# Patient Record
Sex: FEMALE | Race: WHITE | NOT HISPANIC OR LATINO | Employment: OTHER | URBAN - NONMETROPOLITAN AREA
[De-identification: names, ages, dates, MRNs, and addresses within clinical notes are randomized per-mention and may not be internally consistent; named-entity substitution may affect disease eponyms.]

---

## 2022-09-19 LAB
LV EF: 65 %
LVEF MODALITY: NORMAL

## 2023-11-21 PROBLEM — Z79.01 LONG TERM CURRENT USE OF ANTICOAGULANT THERAPY: Status: ACTIVE | Noted: 2023-11-21

## 2023-11-21 PROBLEM — Z79.899 HIGH RISK MEDICATION USE: Status: ACTIVE | Noted: 2023-11-21

## 2023-11-21 PROBLEM — R93.1 ECHOCARDIOGRAM ABNORMAL: Status: ACTIVE | Noted: 2023-11-21

## 2023-11-21 PROBLEM — I48.0 PAROXYSMAL ATRIAL FIBRILLATION (MULTI): Status: ACTIVE | Noted: 2023-11-21

## 2023-11-21 PROBLEM — R03.0 ELEVATED BP WITHOUT DIAGNOSIS OF HYPERTENSION: Status: ACTIVE | Noted: 2023-11-21

## 2023-11-21 PROBLEM — R07.9 CHEST PAIN: Status: ACTIVE | Noted: 2023-11-21

## 2023-11-21 RX ORDER — PANTOPRAZOLE SODIUM 40 MG/1
1 TABLET, DELAYED RELEASE ORAL DAILY
COMMUNITY

## 2023-11-21 RX ORDER — FLECAINIDE ACETATE 100 MG/1
100 TABLET ORAL EVERY 12 HOURS
COMMUNITY
End: 2024-01-19 | Stop reason: SDUPTHER

## 2023-11-21 RX ORDER — WARFARIN SODIUM 5 MG/1
5 TABLET ORAL
COMMUNITY

## 2023-11-21 RX ORDER — PROPRANOLOL/HYDROCHLOROTHIAZID 40 MG-25MG
1 TABLET ORAL DAILY
COMMUNITY
End: 2024-06-05 | Stop reason: ALTCHOICE

## 2023-11-21 RX ORDER — COLD-HOT PACK
1 EACH MISCELLANEOUS DAILY
COMMUNITY

## 2023-11-21 RX ORDER — METOPROLOL SUCCINATE 25 MG/1
1 TABLET, EXTENDED RELEASE ORAL DAILY
COMMUNITY
Start: 2022-07-25

## 2023-11-21 RX ORDER — DICYCLOMINE HYDROCHLORIDE 20 MG/1
1 TABLET ORAL DAILY
COMMUNITY
End: 2024-03-18 | Stop reason: ALTCHOICE

## 2023-11-21 RX ORDER — IBUPROFEN 100 MG/5ML
1 SUSPENSION, ORAL (FINAL DOSE FORM) ORAL DAILY
COMMUNITY

## 2023-11-22 ENCOUNTER — OFFICE VISIT (OUTPATIENT)
Dept: CARDIOLOGY | Facility: CLINIC | Age: 68
End: 2023-11-22
Payer: MEDICARE

## 2023-11-22 VITALS
BODY MASS INDEX: 46.78 KG/M2 | SYSTOLIC BLOOD PRESSURE: 150 MMHG | DIASTOLIC BLOOD PRESSURE: 80 MMHG | WEIGHT: 264 LBS | HEIGHT: 63 IN | HEART RATE: 58 BPM

## 2023-11-22 DIAGNOSIS — Z79.899 HIGH RISK MEDICATION USE: ICD-10-CM

## 2023-11-22 DIAGNOSIS — Z79.01 LONG TERM CURRENT USE OF ANTICOAGULANT THERAPY: ICD-10-CM

## 2023-11-22 DIAGNOSIS — I48.0 PAROXYSMAL ATRIAL FIBRILLATION (MULTI): Primary | ICD-10-CM

## 2023-11-22 PROCEDURE — 93000 ELECTROCARDIOGRAM COMPLETE: CPT | Performed by: INTERNAL MEDICINE

## 2023-11-22 PROCEDURE — 1036F TOBACCO NON-USER: CPT | Performed by: INTERNAL MEDICINE

## 2023-11-22 PROCEDURE — 99214 OFFICE O/P EST MOD 30 MIN: CPT | Performed by: INTERNAL MEDICINE

## 2023-11-22 NOTE — PROGRESS NOTES
"Subjective   Letty Goncalves is a 68 y.o. female       Chief Complaint    Follow-up          HPI     Patient returns in follow-up of problems as noted.  In the interim she has had occasional paroxysms of atrial fibrillation.  She describes palpitation.  She states it can last up to 7 hours.  Unfortunately today she is in sinus rhythm.  Is not clear whether she is going out of rhythm, or not.  We offered Holter monitoring and/or event monitoring but she declines.  Ultimately we advised her to go to the emergency room if she has an episode after hours, but if during office hours call and we will attempt to perform an EKG in the office to determine if in fact her complaints are rhythm related    She has a long list of other subjective complaints.  Activity intolerance, easy fatigue, subjective shortness of breath.  Also atypical chest pain.  These been going on for several years.  Because of those complaints a stress perfusion study was done last year and it is normal.    She is a never smoker, nondiabetic, and there is no family history of CAD.  Her only risk factor, blood pressure, is well controlled.  Lipid status is also favorable.  The likelihood of coronary disease in light of her risk profile and a recently negative stress test is very low because of this education and reassurance were provided.          Visit Vitals  /80 (BP Location: Left arm, Patient Position: Sitting)   Pulse 58   Ht 1.6 m (5' 3\")   Wt 120 kg (264 lb)   BMI 46.77 kg/m²   Smoking Status Never   BSA 2.31 m²      EKG done in office today     Objective   Physical Exam  Constitutional:       Appearance: Normal appearance. She is normal weight.   HENT:      Nose: Nose normal.   Neck:      Vascular: No carotid bruit.   Cardiovascular:      Rate and Rhythm: Normal rate.      Pulses: Normal pulses.      Heart sounds: Normal heart sounds.   Pulmonary:      Effort: Pulmonary effort is normal.   Abdominal:      General: Bowel sounds are normal.    "   Palpations: Abdomen is soft.   Genitourinary:     Rectum: Normal.   Musculoskeletal:         General: Normal range of motion.      Cervical back: Normal range of motion.      Right lower leg: No edema.      Left lower leg: No edema.   Skin:     General: Skin is warm and dry.   Neurological:      General: No focal deficit present.      Mental Status: She is alert.   Psychiatric:         Mood and Affect: Mood normal.         Behavior: Behavior normal.         Thought Content: Thought content normal.         Judgment: Judgment normal.         Current Medications    Current Outpatient Medications:     ascorbic acid (Vitamin C) 1,000 mg tablet, Take 1 tablet (1,000 mg) by mouth once daily., Disp: , Rfl:     BACILLUS COAGULANS-INULIN ORAL, Take 1 capsule by mouth once daily., Disp: , Rfl:     cranberry conc-C-bacillus coag (Cranberry Urinary Tract Health) 250-30-3.5 mg tablet, Take 1 tablet by mouth once daily., Disp: , Rfl:     dicyclomine (Bentyl) 20 mg tablet, Take 1 capsule by mouth once daily., Disp: , Rfl:     flecainide (Tambocor) 100 mg tablet, Take 1 tablet (100 mg) by mouth every 12 hours., Disp: , Rfl:     metoprolol succinate XL (Toprol-XL) 25 mg 24 hr tablet, Take 1 tablet (25 mg) by mouth once daily., Disp: , Rfl:     multivitamin-Ca-iron-minerals (Tab-A-Cj Womens) 27-0.4 mg tablet, Take 1 tablet by mouth once daily., Disp: , Rfl:     pantoprazole (Protonix) 40 mg EC tablet, Take 1 tablet (40 mg) by mouth once daily., Disp: , Rfl:     turmeric-turmeric root extract 450-50 mg capsule, Take 1 capsule by mouth once daily., Disp: , Rfl:     warfarin (Coumadin) 5 mg tablet, Take 1 tablet (5 mg) by mouth. Take as directed per [facility] coumadin clinic, Disp: , Rfl:                      Assessment/Plan     1. Paroxysmal atrial fibrillation (CMS/HCC)  Symptomatic episodes are noted.  She was encouraged to either go to emergency or come to the office for an EKG.  She declined monitoring.    2. High risk medication  use  QTc is acceptable on current EKG    3. Long term current use of anticoagulant therapy  Adequate control and no bleeding complications.

## 2024-01-18 ENCOUNTER — TELEPHONE (OUTPATIENT)
Dept: CARDIOLOGY | Facility: CLINIC | Age: 69
End: 2024-01-18
Payer: MEDICARE

## 2024-01-18 DIAGNOSIS — I48.0 PAROXYSMAL ATRIAL FIBRILLATION (MULTI): Primary | ICD-10-CM

## 2024-01-18 NOTE — TELEPHONE ENCOUNTER
Patient phoned in stating she told you she would be willing to do a holter after the holidays. She is calling to ask if this still needs to be done?        Please advise.    To Dr. Bon James MD

## 2024-01-19 DIAGNOSIS — I48.0 PAROXYSMAL ATRIAL FIBRILLATION (MULTI): Primary | ICD-10-CM

## 2024-01-19 RX ORDER — FLECAINIDE ACETATE 100 MG/1
100 TABLET ORAL EVERY 12 HOURS
Qty: 180 TABLET | Refills: 3 | Status: SHIPPED | OUTPATIENT
Start: 2024-01-19 | End: 2024-03-12 | Stop reason: DRUGHIGH

## 2024-01-19 NOTE — TELEPHONE ENCOUNTER
Spoke with patient states she would like event monitor to be done at [facility].     Task sent to  to call and arrange once order signed.

## 2024-02-01 ENCOUNTER — ANCILLARY PROCEDURE (OUTPATIENT)
Dept: CARDIOLOGY | Facility: CLINIC | Age: 69
End: 2024-02-01
Payer: MEDICARE

## 2024-02-01 ENCOUNTER — TELEPHONE (OUTPATIENT)
Dept: CARDIOLOGY | Facility: CLINIC | Age: 69
End: 2024-02-01

## 2024-02-01 DIAGNOSIS — I48.0 PAROXYSMAL ATRIAL FIBRILLATION (MULTI): ICD-10-CM

## 2024-02-01 PROCEDURE — 93272 ECG/REVIEW INTERPRET ONLY: CPT | Performed by: INTERNAL MEDICINE

## 2024-03-07 DIAGNOSIS — I48.0 PAROXYSMAL ATRIAL FIBRILLATION (MULTI): ICD-10-CM

## 2024-03-07 NOTE — TELEPHONE ENCOUNTER
----- Message from Bon James MD sent at 3/6/2024  5:58 PM EST -----  Event monitor shows frequent episodes of atrial fibrillation.  I recommend increasing flecainide to 150 mg twice a day and EKG in about 5 days.  ----- Message -----  From: Bon James MD  Sent: 3/5/2024   4:57 PM EST  To: Bon James MD

## 2024-03-14 RX ORDER — FLECAINIDE ACETATE 150 MG/1
150 TABLET ORAL 2 TIMES DAILY
Qty: 60 TABLET | Refills: 11 | Status: SHIPPED | OUTPATIENT
Start: 2024-03-14 | End: 2025-03-14

## 2024-03-18 ENCOUNTER — CLINICAL SUPPORT (OUTPATIENT)
Dept: CARDIOLOGY | Facility: CLINIC | Age: 69
End: 2024-03-18
Payer: MEDICARE

## 2024-03-18 VITALS
HEART RATE: 61 BPM | BODY MASS INDEX: 47.66 KG/M2 | HEIGHT: 62 IN | SYSTOLIC BLOOD PRESSURE: 140 MMHG | DIASTOLIC BLOOD PRESSURE: 90 MMHG | WEIGHT: 259 LBS

## 2024-03-18 DIAGNOSIS — I48.0 PAROXYSMAL ATRIAL FIBRILLATION (MULTI): ICD-10-CM

## 2024-03-18 PROCEDURE — 93000 ELECTROCARDIOGRAM COMPLETE: CPT | Performed by: INTERNAL MEDICINE

## 2024-03-18 NOTE — PROGRESS NOTES
"Patient here for at EKG visit ordered by Dr. James due to Afib. Dr. James in suite physician. Patient here due to recent medication changes . Flecainide increased to 150mg BID. Medication list Updated verbally.NO cardiac complaints. Discussed with Kaycee Armas RN prior to discharge.     To Dr. James for review        Vitals:    03/18/24 1647   BP: 140/90   BP Location: Right arm   Patient Position: Sitting   Pulse: 61   Weight: 117 kg (259 lb)   Height: 1.575 m (5' 2\")       "

## 2024-05-01 ENCOUNTER — TELEPHONE (OUTPATIENT)
Dept: CARDIOLOGY | Facility: CLINIC | Age: 69
End: 2024-05-01
Payer: MEDICARE

## 2024-05-01 NOTE — TELEPHONE ENCOUNTER
Patient phoned is scheduled to have esophageal scope with Dr. Stauffer on 5/21/24, requesting to hold Warfarin for 5 days prior to procedure. Please advise.    To Dr. Bon James MD for review.

## 2024-05-02 NOTE — TELEPHONE ENCOUNTER
Phoned and spoke with patient. Advised ok to hold and to advise clinic that monitors of the hold. Patient verbalized understanding.

## 2024-06-05 ENCOUNTER — OFFICE VISIT (OUTPATIENT)
Dept: CARDIOLOGY | Facility: CLINIC | Age: 69
End: 2024-06-05
Payer: MEDICARE

## 2024-06-05 VITALS
DIASTOLIC BLOOD PRESSURE: 88 MMHG | HEIGHT: 62 IN | WEIGHT: 253 LBS | SYSTOLIC BLOOD PRESSURE: 138 MMHG | BODY MASS INDEX: 46.56 KG/M2 | HEART RATE: 66 BPM

## 2024-06-05 DIAGNOSIS — Z79.01 LONG TERM CURRENT USE OF ANTICOAGULANT THERAPY: Primary | ICD-10-CM

## 2024-06-05 DIAGNOSIS — I48.0 PAROXYSMAL ATRIAL FIBRILLATION (MULTI): ICD-10-CM

## 2024-06-05 PROCEDURE — 93000 ELECTROCARDIOGRAM COMPLETE: CPT | Performed by: INTERNAL MEDICINE

## 2024-06-05 PROCEDURE — 1159F MED LIST DOCD IN RCRD: CPT | Performed by: INTERNAL MEDICINE

## 2024-06-05 PROCEDURE — 1036F TOBACCO NON-USER: CPT | Performed by: INTERNAL MEDICINE

## 2024-06-05 PROCEDURE — 3008F BODY MASS INDEX DOCD: CPT | Performed by: INTERNAL MEDICINE

## 2024-06-05 PROCEDURE — 99214 OFFICE O/P EST MOD 30 MIN: CPT | Performed by: INTERNAL MEDICINE

## 2024-06-05 RX ORDER — SPIRONOLACTONE 25 MG/1
12.5 TABLET ORAL DAILY
Qty: 45 TABLET | Refills: 3 | Status: SHIPPED | OUTPATIENT
Start: 2024-06-05 | End: 2025-06-05

## 2024-06-05 RX ORDER — HYOSCYAMINE SULFATE 0.125 MG
TABLET ORAL
COMMUNITY
Start: 2024-06-01

## 2024-06-05 RX ORDER — ATORVASTATIN CALCIUM 20 MG/1
20 TABLET, FILM COATED ORAL DAILY
COMMUNITY

## 2024-06-05 RX ORDER — FENUGREEK SEED/BL.THISTLE/ANIS 340 MG
1 CAPSULE ORAL DAILY
COMMUNITY

## 2024-06-05 NOTE — PROGRESS NOTES
"Subjective   Letty Goncalves is a 68 y.o. female       Chief Complaint    Follow-up          HPI     Patient returns in follow-up of problems as noted.  She is done relatively well but occasionally has breakthroughs of atrial fibrillation.  She is already on a rather aggressive dose of flecainide and because of this am hesitant to increase the dose.  I recommend, though, the addition of spironolactone.  I believe this will probably reduce the frequency and duration of her episodes of atrial fibrillation.  The physiology behind this was explained.  She concurs and will subscribe to my recommendation.    In regards to her anticoagulant strategy she is doing well and is well-tolerated without complaint or side effect.  We note lipids are adequately controlled as well and because of this we suggest no change.  We did advocate the merits of diet and weight loss.      Vitals:    06/05/24 0922   BP: 138/88   BP Location: Left arm   Patient Position: Sitting   Pulse: 66   Weight: 115 kg (253 lb)   Height: 1.575 m (5' 2\")      EKG done in office today      Objective   Physical Exam  Constitutional:       Appearance: Normal appearance.   HENT:      Nose: Nose normal.   Neck:      Vascular: No carotid bruit.   Cardiovascular:      Rate and Rhythm: Normal rate.      Pulses: Normal pulses.      Heart sounds: Normal heart sounds.   Pulmonary:      Effort: Pulmonary effort is normal.   Abdominal:      General: Bowel sounds are normal.      Palpations: Abdomen is soft.   Musculoskeletal:         General: Normal range of motion.      Cervical back: Normal range of motion.      Right lower leg: No edema.      Left lower leg: No edema.   Skin:     General: Skin is warm and dry.   Neurological:      General: No focal deficit present.      Mental Status: She is alert.   Psychiatric:         Mood and Affect: Mood normal.         Behavior: Behavior normal.         Thought Content: Thought content normal.         Judgment: Judgment normal. "         Allergies  Patient has no known allergies.     Current Medications    Current Outpatient Medications:     ascorbic acid (Vitamin C) 1,000 mg tablet, Take 1 tablet (1,000 mg) by mouth once daily., Disp: , Rfl:     atorvastatin (Lipitor) 20 mg tablet, Take 1 tablet (20 mg) by mouth once daily., Disp: , Rfl:     calcium polycarbophil (FIBERCON ORAL), Take 1 capsule by mouth once daily., Disp: , Rfl:     cranberry conc-C-bacillus coag (Cranberry Urinary Tract Health) 250-30-3.5 mg tablet, Take 1 tablet by mouth once daily., Disp: , Rfl:     flecainide (Tambocor) 150 mg tablet, Take 1 tablet (150 mg) by mouth 2 times a day., Disp: 60 tablet, Rfl: 11    hyoscyamine (Anaspaz, Levsin) 0.125 mg tablet, TAKE 1 TABLET BY MOUTH 4 TIMES DAILY AS NEEDED FOR DYSPEPSIA FOR 30 DAYS, Disp: , Rfl:     Lactobac 42-Bifid 6-mmyizq-CGW (Probiotic Plus Colostrum) -50 mg powder in packet, Take 1 capsule by mouth once daily., Disp: , Rfl:     metoprolol succinate XL (Toprol-XL) 25 mg 24 hr tablet, Take 1 tablet (25 mg) by mouth once daily., Disp: , Rfl:     multivitamin-Ca-iron-minerals (Tab-A-Cj Womens) 27-0.4 mg tablet, Take 1 tablet by mouth once daily., Disp: , Rfl:     pantoprazole (Protonix) 40 mg EC tablet, Take 1 tablet (40 mg) by mouth once daily., Disp: , Rfl:     warfarin (Coumadin) 5 mg tablet, Take 1 tablet (5 mg) by mouth. Take as directed per [facility] [place] [place] coumadin clinic, Disp: , Rfl:     spironolactone (Aldactone) 25 mg tablet, Take 0.5 tablets (12.5 mg) by mouth once daily., Disp: 45 tablet, Rfl: 3                     Assessment/Plan   1. Paroxysmal atrial fibrillation (Multi)  Brief paroxysms noted.  I believe that spironolactone probably will help reduce frequency  - Follow Up In Cardiology      2. Long term current use of anticoagulant therapy  Well-tolerated and stable.  No complaints or side effects    3. BMI 45.0-49.9, adult (Multi)  The merits of weight loss advocated        Scribe Attestation  By signing my  name below, I, Usama Mcdaniel LPNibmatt   attest that this documentation has been prepared under the direction and in the presence of Bon James MD.     Provider Attestation - Scribe documentation    All medical record entries made by the Scribe were at my direction and personally dictated by me. I have reviewed the chart and agree that the record accurately reflects my personal performance of the history, physical exam, discussion and plan.

## 2024-06-21 ENCOUNTER — TELEPHONE (OUTPATIENT)
Dept: CARDIOLOGY | Facility: CLINIC | Age: 69
End: 2024-06-21
Payer: MEDICARE

## 2024-06-21 DIAGNOSIS — I48.0 PAROXYSMAL ATRIAL FIBRILLATION (MULTI): ICD-10-CM

## 2024-06-21 NOTE — TELEPHONE ENCOUNTER
----- Message from Letty Goncalves sent at 6/21/2024  3:28 PM EDT -----  Regarding: Heart rate  Contact: [phone]  It been three days. Dizzy, lighthead

## 2024-06-21 NOTE — TELEPHONE ENCOUNTER
Phone call to patient, states she continues with heart rate at 100 with no improvement. Still complains of dizziness and lightheadedness, denies any worsening of symptoms or any new symptoms at this time. Advised patient to go to nearest emergency room if symptoms should worsen.

## 2024-06-21 NOTE — TELEPHONE ENCOUNTER
----- Message from Letty Goncalves sent at 6/21/2024  3:28 PM EDT -----  Regarding: Heart rate  Contact: [phone]  It been three days. Dizzy, lighthead

## 2024-06-25 RX ORDER — METOPROLOL SUCCINATE 25 MG/1
25 TABLET, EXTENDED RELEASE ORAL DAILY
Qty: 90 TABLET | Refills: 3 | Status: SHIPPED | OUTPATIENT
Start: 2024-06-25 | End: 2024-06-26 | Stop reason: SDUPTHER

## 2024-06-26 DIAGNOSIS — I48.0 PAROXYSMAL ATRIAL FIBRILLATION (MULTI): ICD-10-CM

## 2024-06-26 DIAGNOSIS — R00.0 TACHYCARDIA: ICD-10-CM

## 2024-06-26 NOTE — TELEPHONE ENCOUNTER
Left message for patient to call back regarding recommendations.   Regarding: FW: Heart rate  Contact: [phone]  Increase metoprolol to 50 mg a day.  Come to the office for blood pressure check with orthostatics and EKG  ----- Message -----  From: Ollie Hollingsworth MA  Sent: 6/21/2024   3:47 PM EDT  To: Bon James MD  Subject: Heart rate                                       ----- Message from Ollie Hollingsworth MA sent at 6/21/2024  3:47 PM EDT -----       ----- Message from Letty Goncalves to Bon James MD sent at 6/21/2024  3:28 PM -----   It been three days. Dizzy, lighthead      ----- Message -----       From:Ollie AGUERO       Sent:6/21/2024  2:38 PM EDT         To:Letty Goncalves    Subject:Heart rate    Alexandramatt Mrs. Goncalves,  Are you having any symptoms along with this heart rate? How long has this been going on for?  ARNIE Reed       ----- Message -----       From:Letty Goncalves       Sent:6/21/2024 12:12 PM EDT         To:Bon James    Subject:Heart rate    My heart rate has been 100 resting. Is this OK?

## 2024-06-26 NOTE — TELEPHONE ENCOUNTER
Patient phoned, orders given. Verbalized understanding. Give to scheduling to follow-up for bp OV with EKG. Patient would prefer [place] office next week if possible.

## 2024-06-27 RX ORDER — METOPROLOL SUCCINATE 50 MG/1
50 TABLET, EXTENDED RELEASE ORAL DAILY
Qty: 90 TABLET | Refills: 3 | Status: SHIPPED | OUTPATIENT
Start: 2024-06-27 | End: 2025-06-27

## 2024-06-28 LAB
NON-UH HIE ANION GAP:SCNC:PT:SER/PLAS:QN:: 8 MEQ/L (ref 6–16)
NON-UH HIE CALCIUM:MCNC:PT:SER/PLAS:QN:: 9.4 MG/DL (ref 8.9–11.1)
NON-UH HIE CARBON DIOXIDE:SCNC:PT:SER/PLAS:QN:: 30 MMOL/L (ref 21–31)
NON-UH HIE CHLORIDE:SCNC:PT:SER/PLAS:QN:: 106 MMOL/L (ref 101–111)
NON-UH HIE CREATININE:MCNC:PT:SER/PLAS:QN:: 0.7 MG/DL (ref 0.5–1.3)
NON-UH HIE EGFR: 94 ML/MIN/1.73 M2
NON-UH HIE GLUCOSE:MCNC:PT:SER/PLAS:QN:: 95 MG/DL (ref 55–199)
NON-UH HIE POTASSIUM:SCNC:PT:SER/PLAS:QN:: 4.1 MMOL/L (ref 3.5–5.3)
NON-UH HIE SODIUM:SCNC:PT:SER/PLAS:QN:: 140 MMOL/L (ref 135–145)
NON-UH HIE UREA NITROGEN/CREATININE:MRTO:PT:SER/PLAS:QN:: 21 NO UNITS (ref 10–20)
NON-UH HIE UREA NITROGEN:MCNC:PT:SER/PLAS:QN:: 15 MG/DL (ref 5–21)

## 2024-07-01 ENCOUNTER — APPOINTMENT (OUTPATIENT)
Dept: CARDIOLOGY | Facility: CLINIC | Age: 69
End: 2024-07-01
Payer: MEDICARE

## 2024-07-02 ENCOUNTER — APPOINTMENT (OUTPATIENT)
Dept: CARDIOLOGY | Facility: CLINIC | Age: 69
End: 2024-07-02
Payer: MEDICARE

## 2024-07-02 VITALS
BODY MASS INDEX: 46.82 KG/M2 | HEART RATE: 62 BPM | WEIGHT: 256 LBS | DIASTOLIC BLOOD PRESSURE: 96 MMHG | SYSTOLIC BLOOD PRESSURE: 132 MMHG

## 2024-07-02 DIAGNOSIS — Z79.01 LONG TERM CURRENT USE OF ANTICOAGULANT THERAPY: ICD-10-CM

## 2024-07-02 DIAGNOSIS — R00.0 TACHYCARDIA: ICD-10-CM

## 2024-07-02 DIAGNOSIS — I48.0 PAROXYSMAL ATRIAL FIBRILLATION (MULTI): ICD-10-CM

## 2024-07-02 DIAGNOSIS — I10 ESSENTIAL HYPERTENSION: ICD-10-CM

## 2024-07-02 PROBLEM — I89.0 LYMPHEDEMA: Status: ACTIVE | Noted: 2024-07-02

## 2024-07-02 PROBLEM — R03.0 ELEVATED BP WITHOUT DIAGNOSIS OF HYPERTENSION: Status: RESOLVED | Noted: 2023-11-21 | Resolved: 2024-07-02

## 2024-07-02 PROBLEM — Z79.899 HIGH RISK MEDICATION USE: Status: RESOLVED | Noted: 2023-11-21 | Resolved: 2024-07-02

## 2024-07-02 PROCEDURE — 1036F TOBACCO NON-USER: CPT | Performed by: INTERNAL MEDICINE

## 2024-07-02 PROCEDURE — 3080F DIAST BP >= 90 MM HG: CPT | Performed by: INTERNAL MEDICINE

## 2024-07-02 PROCEDURE — 1159F MED LIST DOCD IN RCRD: CPT | Performed by: INTERNAL MEDICINE

## 2024-07-02 PROCEDURE — 3008F BODY MASS INDEX DOCD: CPT | Performed by: INTERNAL MEDICINE

## 2024-07-02 PROCEDURE — 93000 ELECTROCARDIOGRAM COMPLETE: CPT | Performed by: INTERNAL MEDICINE

## 2024-07-02 PROCEDURE — 3075F SYST BP GE 130 - 139MM HG: CPT | Performed by: INTERNAL MEDICINE

## 2024-07-02 PROCEDURE — 99212 OFFICE O/P EST SF 10 MIN: CPT | Performed by: INTERNAL MEDICINE

## 2024-07-02 RX ORDER — VALSARTAN 80 MG/1
80 TABLET ORAL DAILY
Qty: 90 TABLET | Refills: 3 | Status: SHIPPED | OUTPATIENT
Start: 2024-07-02 | End: 2025-07-02

## 2024-07-02 ASSESSMENT — ENCOUNTER SYMPTOMS
PALPITATIONS: 1
HYPERTENSION: 1
DIZZINESS: 1
LIGHT-HEADEDNESS: 1

## 2024-07-02 NOTE — PROGRESS NOTES
Subjective   Letty Winters is a 68 y.o. female       Chief Complaint    Hypertension          Hypertension  Associated symptoms include palpitations.      The patient was seen today for follow-up for hypertension EKG ordered by Dr. James, she will continue to follow-up with me down the road.  I know her from taking care of her  Tawanda winters.  The patient continued to have palpitations and apparently event monitor have demonstrated breakthrough atrial fibrillation while she is on maximal dose of flecainide.  Addressed that with the patient and recommended consideration of ablation which she is agreeable to.  As far as hypertension management I will add valsartan 80 mg daily to present dose spironolactone if necessary titrate upwards.  More weight loss is needed  Review of Systems   Cardiovascular:  Positive for palpitations.   Neurological:  Positive for dizziness and light-headedness.            Vitals:    07/02/24 0951 07/02/24 0952 07/02/24 0953   BP: (!) 148/92 (!) 136/92 (!) 132/96   BP Location: Left arm Left arm Right arm   Patient Position: Sitting Standing Sitting   Pulse: 62     Weight: 116 kg (256 lb)          EKG done in office today     Objective   Physical Exam  Constitutional:       Appearance: Normal appearance.   HENT:      Nose: Nose normal.   Neck:      Vascular: No carotid bruit.   Cardiovascular:      Rate and Rhythm: Normal rate.      Pulses: Normal pulses.      Heart sounds: Normal heart sounds.   Pulmonary:      Effort: Pulmonary effort is normal.   Abdominal:      General: Bowel sounds are normal.      Palpations: Abdomen is soft.   Musculoskeletal:         General: Normal range of motion.      Cervical back: Normal range of motion.      Right lower leg: No edema.      Left lower leg: No edema.   Skin:     General: Skin is warm and dry.   Neurological:      General: No focal deficit present.      Mental Status: She is alert.   Psychiatric:         Mood and Affect: Mood normal.          Behavior: Behavior normal.         Thought Content: Thought content normal.         Judgment: Judgment normal.         Allergies  Patient has no known allergies.     Current Medications    Current Outpatient Medications:     ascorbic acid (Vitamin C) 1,000 mg tablet, Take 1 tablet (1,000 mg) by mouth once daily., Disp: , Rfl:     atorvastatin (Lipitor) 20 mg tablet, Take 1 tablet (20 mg) by mouth once daily., Disp: , Rfl:     calcium polycarbophil (FIBERCON ORAL), Take 1 capsule by mouth once daily., Disp: , Rfl:     cranberry conc-C-bacillus coag (Cranberry Urinary Tract Health) 250-30-3.5 mg tablet, Take 1 tablet by mouth once daily., Disp: , Rfl:     flecainide (Tambocor) 150 mg tablet, Take 1 tablet (150 mg) by mouth 2 times a day., Disp: 60 tablet, Rfl: 11    hyoscyamine (Anaspaz, Levsin) 0.125 mg tablet, TAKE 1 TABLET BY MOUTH 4 TIMES DAILY AS NEEDED FOR DYSPEPSIA FOR 30 DAYS, Disp: , Rfl:     Lactobac 42-Bifid 4-enrdnb-CNC (Probiotic Plus Colostrum) -50 mg powder in packet, Take 1 capsule by mouth once daily., Disp: , Rfl:     metoprolol succinate XL (Toprol-XL) 50 mg 24 hr tablet, Take 1 tablet (50 mg) by mouth once daily., Disp: 90 tablet, Rfl: 3    multivitamin-Ca-iron-minerals (Tab-A-Cj Womens) 27-0.4 mg tablet, Take 1 tablet by mouth once daily., Disp: , Rfl:     pantoprazole (Protonix) 40 mg EC tablet, Take 1 tablet (40 mg) by mouth once daily., Disp: , Rfl:     spironolactone (Aldactone) 25 mg tablet, Take 0.5 tablets (12.5 mg) by mouth once daily., Disp: 45 tablet, Rfl: 3    warfarin (Coumadin) 5 mg tablet, Take 1 tablet (5 mg) by mouth. Take as directed per [facility] [place], Disp: , Rfl:                      Assessment/Plan   1. Tachycardia  Follow Up In Cardiology      2. Paroxysmal atrial fibrillation (Multi)        3. Essential hypertension        4. Long term current use of anticoagulant therapy        5. BMI 45.0-49.9, adult (Multi)                 Scribe Attestation  By signing my  name below, I, Usama Su LPNibmatt   attest that this documentation has been prepared under the direction and in the presence of Analy Zhang MD.     Provider Attestation - Scribe documentation    All medical record entries made by the Scribe were at my direction and personally dictated by me. I have reviewed the chart and agree that the record accurately reflects my personal performance of the history, physical exam, discussion and plan.

## 2024-07-07 DIAGNOSIS — I48.0 PAROXYSMAL ATRIAL FIBRILLATION (MULTI): Primary | ICD-10-CM

## 2024-07-09 RX ORDER — METOPROLOL SUCCINATE 25 MG/1
25 TABLET, EXTENDED RELEASE ORAL DAILY
Qty: 90 TABLET | Refills: 0 | Status: SHIPPED | OUTPATIENT
Start: 2024-07-09

## 2024-07-16 DIAGNOSIS — I48.0 PAROXYSMAL ATRIAL FIBRILLATION (MULTI): ICD-10-CM

## 2024-07-16 RX ORDER — METOPROLOL SUCCINATE 50 MG/1
50 TABLET, EXTENDED RELEASE ORAL DAILY
Qty: 90 TABLET | Refills: 3 | Status: SHIPPED | OUTPATIENT
Start: 2024-07-16 | End: 2025-07-16

## 2024-07-17 NOTE — PROGRESS NOTES
Current Outpatient Medications   Medication Instructions    ascorbic acid (Vitamin C) 1,000 mg tablet 1 tablet, oral, Daily    atorvastatin (LIPITOR) 20 mg, oral, Daily    calcium polycarbophil (FIBERCON ORAL) 1 capsule, oral, Daily    cranberry conc-C-bacillus coag (Cranberry Urinary Tract Health) 250-30-3.5 mg tablet 1 tablet, oral, Daily    flecainide (TAMBOCOR) 150 mg, oral, 2 times daily    hyoscyamine (Anaspaz, Levsin) 0.125 mg tablet TAKE 1 TABLET BY MOUTH 4 TIMES DAILY AS NEEDED FOR DYSPEPSIA FOR 30 DAYS    Lactobac 42-Bifid 5-uwfgye-BWT (Probiotic Plus Colostrum) -50 mg powder in packet 1 capsule, oral, Daily    metoprolol succinate XL (TOPROL-XL) 50 mg, oral, Daily    multivitamin-Ca-iron-minerals (Tab-A-Cj Womens) 27-0.4 mg tablet 1 tablet, oral, Daily    pantoprazole (Protonix) 40 mg EC tablet 1 tablet, oral, Daily    spironolactone (ALDACTONE) 12.5 mg, oral, Daily    valsartan (DIOVAN) 80 mg, oral, Daily    warfarin (COUMADIN) 5 mg, oral, Take as directed per [facility] coumadin clinic      Subjective   Letty Goncalves is a 68 y.o. female.    Chief Complaint:  Atrial Fibrillation  [id number] Score: 3     HPI    Patient is referred by Dr. Analy Zhang for A-fib, possible RFA.    PMH includes:HTN, pAF, lymphedema,     AF index DX: 06/2022 during a colonoscopy  Symptoms include: palpitations, dizziness and chest pain.  Treatment includes: flecainide, Toprol XL, warfarin    The patient was initially diagnosed with A-fib in 06/2022 during her colonoscopy and started on Toprol XL. In 08/2022, Dr. James started her on anticoagulation with Xarelto. She underwent a stress test and echo in 09/2022 to determine an appropriate antiarrhythmic medication. The stress test showed an EF of 65% with no evidence of ischemia, and the echo revealed a moderately dilated LA and moderate mitral annular calcification. Due to being highly symptomatic with her A-fib, experiencing palpitations, chest pain, and dizziness,  she was started on Flecainide 50mg BID on 09/29/22.    Due to the cost of Xarelto, her anticoagulation was changed to Coumadin. In 02/2023, the patient reported increased episodes of A-fib to her cardiology office, and her Flecainide dosage was increased to 100mg BID. Later in 2023, her Flecainide dosage was further increased to 150mg BID due to continued A-fib episodes. She has never required cardioversion.    Now, despite being on maximal doses of Flecainide, she continues to have breakthrough episodes of A-fib and presents today to discuss the possibility of RFA    TESTING    EVENT MONITOR 30 day 02/01/2024:    1.  There were 30 patient triggered events and 13 automatically triggered events.     2.  Of the patient triggered events 27 demonstrated sinus bradycardia or sinus rhythm.  There were occasional PACs.  The remaining 3 patient triggered events demonstrated atrial fibrillation and/or atrial flutter.     3.  There were 13 automatically triggered events.  Most events were associated with atrial fibrillation.  There were, though, 2 episodes of wide-complex rhythm which appeared to be ventricular tachycardia.  1 episode was a 5 beat run of ventricular tachycardia rate of 126 bpm.  The other episode was a sustained run of ventricular tachycardia with an average heart rate of 176 bpm.  These episodes may represent true ventricular tachyarrhythmia, or, could represent atrial fibrillation with abarent conduction.     4.  The patient had no symptoms reported in the diary.    -ECHO 09/20/2022  CONCLUSIONS:   1. Left ventricular systolic function is normal with a 65-70% estimated ejection fraction.   2. Spectral Doppler shows an impaired relaxation pattern of left ventricular diastolic filling.   3. The left atrium is moderately dilated.   4. There is moderate mitral annular calcification.   5. Aortic valve sclerosis    NUCLEAR STRESS TEST 09/19/22  IMPRESSION:  Normal [employer] cardiac perfusion stress  test.  No evidence of ischemia or myocardial infarction by perfusion imaging.  Normal left ventricular systolic function, ejection fraction 65%.  No previous studies are available for comparison.    Review of Systems   Constitutional: Positive for malaise/fatigue.   HENT: Negative.     Eyes: Negative.    Cardiovascular:  Positive for dyspnea on exertion, leg swelling and palpitations.   Respiratory:  Positive for shortness of breath.    Endocrine: Negative.    Skin: Negative.    Musculoskeletal:  Positive for arthritis and muscle weakness.   Gastrointestinal: Negative.    Genitourinary: Negative.    Neurological:  Positive for weakness.   Psychiatric/Behavioral: Negative.         Objective   Constitutional:       Appearance: Healthy appearance. Not in distress.   Eyes:      Pupils: Pupils are equal, round, and reactive to light.   Neck:      Thyroid: Thyroid normal.      Vascular: JVD normal.   Pulmonary:      Effort: Pulmonary effort is normal.      Breath sounds: Normal breath sounds.   Cardiovascular:      Normal rate. Regular rhythm. S1 with normal intensity. S2 with normal intensity.       Murmurs: There is no murmur.      No gallop.  No click. No rub.      Comments: Lymphedema    Edema:     Thigh: bilateral 4+ edema of the thigh.     Pretibial: bilateral 4+ edema of the pretibial area.     Ankle: bilateral 4+ edema of the ankle.     Feet: bilateral 4+ edema of the feet.  Musculoskeletal: Normal range of motion.      Cervical back: Normal range of motion and neck supple. Skin:     General: Skin is warm and moist.   Neurological:      General: No focal deficit present.      Mental Status: Alert and oriented to person, place and time.      Motor: Motor function is intact.      Gait: Gait is intact.       Assessment/Plan    WE DISCUSSED THE RISKS AND BENEFITS OF AADs vs. AF CATHETER ABLATION.  HE UNDERSTANDS AND WANTS TO PROCEED WITH ABLATION.    SCHEDULE AF ABLATION UNDER GENERAL ANESTHESIA   Obtain 3 weekly INR  > 2.0  HOLD ALL MEDS IN THE MORNING OF ABLATION  CARTO SYSTEM - ANY EP LAB      MD Elmer Morelos Master Clinician of Cardiovascular [place].   [facility] [place].   Director of Electrophysiology Center  Professor of Medicine.   [employer].

## 2024-07-21 ASSESSMENT — CHA2DS2 SCORE
HYPERTENSION: YES
DIABETES: NO
PRIOR STROKE OR TIA OR THROMBOEMBOLISM: NO
CHF OR LEFT VENTRICULAR DYSFUNCTION: NO
SEX: FEMALE
CHA2D2S VASC SCORE: 3
VASCULAR DISEASE: NO
AGE IN YEARS: 65-74

## 2024-07-22 ENCOUNTER — OFFICE VISIT (OUTPATIENT)
Dept: CARDIOLOGY | Facility: CLINIC | Age: 69
End: 2024-07-22
Payer: MEDICARE

## 2024-07-22 VITALS
BODY MASS INDEX: 46.38 KG/M2 | HEART RATE: 59 BPM | WEIGHT: 252 LBS | DIASTOLIC BLOOD PRESSURE: 58 MMHG | SYSTOLIC BLOOD PRESSURE: 153 MMHG | OXYGEN SATURATION: 98 % | HEIGHT: 62 IN

## 2024-07-22 DIAGNOSIS — Z01.818 PREOP TESTING: Primary | ICD-10-CM

## 2024-07-22 DIAGNOSIS — I48.0 PAROXYSMAL ATRIAL FIBRILLATION (MULTI): ICD-10-CM

## 2024-07-22 DIAGNOSIS — Z79.01 LONG TERM CURRENT USE OF ANTICOAGULANT THERAPY: ICD-10-CM

## 2024-07-22 DIAGNOSIS — I48.0 PAF (PAROXYSMAL ATRIAL FIBRILLATION) (MULTI): ICD-10-CM

## 2024-07-22 PROCEDURE — 93005 ELECTROCARDIOGRAM TRACING: CPT | Performed by: INTERNAL MEDICINE

## 2024-07-22 PROCEDURE — 3078F DIAST BP <80 MM HG: CPT | Performed by: INTERNAL MEDICINE

## 2024-07-22 PROCEDURE — 3077F SYST BP >= 140 MM HG: CPT | Performed by: INTERNAL MEDICINE

## 2024-07-22 PROCEDURE — 1036F TOBACCO NON-USER: CPT | Performed by: INTERNAL MEDICINE

## 2024-07-22 PROCEDURE — 1160F RVW MEDS BY RX/DR IN RCRD: CPT | Performed by: INTERNAL MEDICINE

## 2024-07-22 PROCEDURE — 1126F AMNT PAIN NOTED NONE PRSNT: CPT | Performed by: INTERNAL MEDICINE

## 2024-07-22 PROCEDURE — 1159F MED LIST DOCD IN RCRD: CPT | Performed by: INTERNAL MEDICINE

## 2024-07-22 PROCEDURE — 99204 OFFICE O/P NEW MOD 45 MIN: CPT | Performed by: INTERNAL MEDICINE

## 2024-07-22 PROCEDURE — 3008F BODY MASS INDEX DOCD: CPT | Performed by: INTERNAL MEDICINE

## 2024-07-22 PROCEDURE — 99214 OFFICE O/P EST MOD 30 MIN: CPT | Performed by: INTERNAL MEDICINE

## 2024-07-22 ASSESSMENT — ENCOUNTER SYMPTOMS
ENDOCRINE NEGATIVE: 1
PALPITATIONS: 1
SHORTNESS OF BREATH: 1
DYSPNEA ON EXERTION: 1
GASTROINTESTINAL NEGATIVE: 1
DEPRESSION: 0
EYES NEGATIVE: 1
PSYCHIATRIC NEGATIVE: 1
OCCASIONAL FEELINGS OF UNSTEADINESS: 0
WEAKNESS: 1

## 2024-07-22 ASSESSMENT — PATIENT HEALTH QUESTIONNAIRE - PHQ9
SUM OF ALL RESPONSES TO PHQ9 QUESTIONS 1 AND 2: 0
1. LITTLE INTEREST OR PLEASURE IN DOING THINGS: NOT AT ALL
2. FEELING DOWN, DEPRESSED OR HOPELESS: NOT AT ALL

## 2024-07-22 ASSESSMENT — PAIN SCALES - GENERAL: PAINLEVEL: 0-NO PAIN

## 2024-07-25 ENCOUNTER — APPOINTMENT (OUTPATIENT)
Dept: CARDIOLOGY | Facility: CLINIC | Age: 69
End: 2024-07-25
Payer: MEDICARE

## 2024-07-25 VITALS
DIASTOLIC BLOOD PRESSURE: 100 MMHG | SYSTOLIC BLOOD PRESSURE: 170 MMHG | BODY MASS INDEX: 46.38 KG/M2 | WEIGHT: 252 LBS | HEART RATE: 54 BPM | HEIGHT: 62 IN

## 2024-07-25 DIAGNOSIS — I10 ESSENTIAL HYPERTENSION: ICD-10-CM

## 2024-07-25 DIAGNOSIS — I48.0 PAROXYSMAL ATRIAL FIBRILLATION (MULTI): ICD-10-CM

## 2024-07-25 PROCEDURE — 1159F MED LIST DOCD IN RCRD: CPT | Performed by: INTERNAL MEDICINE

## 2024-07-25 PROCEDURE — 3008F BODY MASS INDEX DOCD: CPT | Performed by: INTERNAL MEDICINE

## 2024-07-25 PROCEDURE — 3078F DIAST BP <80 MM HG: CPT | Performed by: INTERNAL MEDICINE

## 2024-07-25 PROCEDURE — 3077F SYST BP >= 140 MM HG: CPT | Performed by: INTERNAL MEDICINE

## 2024-07-25 PROCEDURE — 99212 OFFICE O/P EST SF 10 MIN: CPT | Performed by: INTERNAL MEDICINE

## 2024-07-25 PROCEDURE — 1036F TOBACCO NON-USER: CPT | Performed by: INTERNAL MEDICINE

## 2024-07-25 RX ORDER — METOPROLOL SUCCINATE 25 MG/1
25 TABLET, EXTENDED RELEASE ORAL DAILY
Qty: 90 TABLET | Refills: 3 | Status: SHIPPED | OUTPATIENT
Start: 2024-07-25 | End: 2025-07-25

## 2024-07-25 RX ORDER — AMLODIPINE AND VALSARTAN 5; 160 MG/1; MG/1
1 TABLET ORAL DAILY
Qty: 90 TABLET | Refills: 3 | Status: SHIPPED | OUTPATIENT
Start: 2024-07-25 | End: 2025-07-25

## 2024-07-25 ASSESSMENT — ENCOUNTER SYMPTOMS: PALPITATIONS: 1

## 2024-07-25 NOTE — PROGRESS NOTES
"Subjective   Letty Goncalves is a 68 y.o. female       Chief Complaint    Blood Pressure Check          HPI   Patient is in the office for hypertension management.  She remains hypertensive.  She appears to be in regular rhythm with heart rate 54 bpm.  She is scheduled to have ablation at [facility] with Dr. Pickering for the atrial fibrillation in the next few weeks.  For hypertension management she will be stopping the valsartan and going on a combination of valsartan and amlodipine at the strength of [employer] 60/5 mg daily, due to bradycardia and fatigue I will reduce metoprolol succinate down to 25 mg daily.  Will follow blood pressure reading in few weeks  Review of Systems   Cardiovascular:  Positive for palpitations.   All other systems reviewed and are negative.           Vitals:    07/25/24 0922 07/25/24 0927   BP: 168/78 (!) 170/100   BP Location: Left arm Right arm   Patient Position: Sitting Sitting   Pulse: 54    Weight: 114 kg (252 lb)    Height: 1.575 m (5' 2\")         Objective   Physical Exam    Allergies  Patient has no known allergies.     Current Medications    Current Outpatient Medications:   •  ascorbic acid (Vitamin C) 1,000 mg tablet, Take 1 tablet (1,000 mg) by mouth once daily., Disp: , Rfl:   •  atorvastatin (Lipitor) 20 mg tablet, Take 1 tablet (20 mg) by mouth once daily., Disp: , Rfl:   •  calcium polycarbophil (FIBERCON ORAL), Take 1 capsule by mouth once daily., Disp: , Rfl:   •  cranberry conc-C-bacillus coag (Cranberry Urinary Tract Health) 250-30-3.5 mg tablet, Take 1 tablet by mouth once daily., Disp: , Rfl:   •  flecainide (Tambocor) 150 mg tablet, Take 1 tablet (150 mg) by mouth 2 times a day., Disp: 60 tablet, Rfl: 11  •  hyoscyamine (Anaspaz, Levsin) 0.125 mg tablet, TAKE 1 TABLET BY MOUTH 4 TIMES DAILY AS NEEDED FOR DYSPEPSIA FOR 30 DAYS, Disp: , Rfl:   •  Lactobac 42-Bifid 4-zmbzig-ZVL (Probiotic Plus Colostrum) -50 mg powder in packet, Take 1 capsule by mouth " once daily., Disp: , Rfl:   •  multivitamin-Ca-iron-minerals (Tab-A-Cj Womens) 27-0.4 mg tablet, Take 1 tablet by mouth once daily., Disp: , Rfl:   •  pantoprazole (Protonix) 40 mg EC tablet, Take 1 tablet (40 mg) by mouth once daily., Disp: , Rfl:   •  warfarin (Coumadin) 5 mg tablet, Take 1 tablet (5 mg) by mouth. Take as directed per [facility] coumadin clinic, Disp: , Rfl:   •  amlodipine-valsartan (Exforge) 5-160 mg tablet, Take 1 tablet by mouth once daily., Disp: 90 tablet, Rfl: 3  •  metoprolol succinate XL (Toprol-XL) 25 mg 24 hr tablet, Take 1 tablet (25 mg) by mouth once daily., Disp: 90 tablet, Rfl: 3                     Assessment/Plan   1. Essential hypertension  Follow Up In Cardiology    amlodipine-valsartan (Exforge) 5-160 mg tablet    Follow Up In Cardiology      2. Paroxysmal atrial fibrillation (Multi)  metoprolol succinate XL (Toprol-XL) 25 mg 24 hr tablet               Scribe Attestation  By signing my name below, I, Jammie VO LPN  , Scribe   attest that this documentation has been prepared under the direction and in the presence of Analy Zhang MD.     Provider Attestation - Scribe documentation    All medical record entries made by the Scribe were at my direction and personally dictated by me. I have reviewed the chart and agree that the record accurately reflects my personal performance of the history, physical exam, discussion and plan.

## 2024-07-29 PROBLEM — I48.0 PAF (PAROXYSMAL ATRIAL FIBRILLATION) (MULTI): Status: ACTIVE | Noted: 2024-07-22

## 2024-08-14 ENCOUNTER — APPOINTMENT (OUTPATIENT)
Dept: CARDIOLOGY | Facility: CLINIC | Age: 69
End: 2024-08-14
Payer: MEDICARE

## 2024-08-14 VITALS
WEIGHT: 248.2 LBS | HEIGHT: 63 IN | HEART RATE: 62 BPM | DIASTOLIC BLOOD PRESSURE: 70 MMHG | BODY MASS INDEX: 43.98 KG/M2 | SYSTOLIC BLOOD PRESSURE: 130 MMHG

## 2024-08-14 DIAGNOSIS — I10 ESSENTIAL HYPERTENSION: ICD-10-CM

## 2024-08-14 PROCEDURE — 1036F TOBACCO NON-USER: CPT | Performed by: INTERNAL MEDICINE

## 2024-08-14 PROCEDURE — 3075F SYST BP GE 130 - 139MM HG: CPT | Performed by: INTERNAL MEDICINE

## 2024-08-14 PROCEDURE — 99212 OFFICE O/P EST SF 10 MIN: CPT | Performed by: INTERNAL MEDICINE

## 2024-08-14 PROCEDURE — 3008F BODY MASS INDEX DOCD: CPT | Performed by: INTERNAL MEDICINE

## 2024-08-14 PROCEDURE — 3078F DIAST BP <80 MM HG: CPT | Performed by: INTERNAL MEDICINE

## 2024-08-14 PROCEDURE — 1159F MED LIST DOCD IN RCRD: CPT | Performed by: INTERNAL MEDICINE

## 2024-08-14 NOTE — PROGRESS NOTES
"Subjective   Letty Goncalves is a 69 y.o. female       Chief Complaint    Blood Pressure Check          HPI   Patient is in the office for hypertension management, since we added Exforge 160/5 mg daily her pressure has normalized without any side effects.  She will continue to follow-up with me as scheduled in October 2024, more weight loss was recommended  ROS         Vitals:    08/14/24 1429   BP: 130/70   BP Location: Right arm   Patient Position: Sitting   Pulse: 62   Weight: 113 kg (248 lb 3.2 oz)   Height: 1.6 m (5' 3\")        Objective   Physical Exam    Allergies  Patient has no known allergies.     Current Medications    Current Outpatient Medications:     amlodipine-valsartan (Exforge) 5-160 mg tablet, Take 1 tablet by mouth once daily., Disp: 90 tablet, Rfl: 3    ascorbic acid (Vitamin C) 1,000 mg tablet, Take 1 tablet (1,000 mg) by mouth once daily., Disp: , Rfl:     atorvastatin (Lipitor) 20 mg tablet, Take 1 tablet (20 mg) by mouth once daily., Disp: , Rfl:     calcium polycarbophil (FIBERCON ORAL), Take 1 capsule by mouth once daily., Disp: , Rfl:     cranberry conc-C-bacillus coag (Cranberry Urinary Tract Health) 250-30-3.5 mg tablet, Take 1 tablet by mouth once daily., Disp: , Rfl:     flecainide (Tambocor) 150 mg tablet, Take 1 tablet (150 mg) by mouth 2 times a day., Disp: 60 tablet, Rfl: 11    Lactobac 42-Bifid 0-kqmvuz-PGD (Probiotic Plus Colostrum) -50 mg powder in packet, Take 1 capsule by mouth once daily., Disp: , Rfl:     metoprolol succinate XL (Toprol-XL) 25 mg 24 hr tablet, Take 1 tablet (25 mg) by mouth once daily., Disp: 90 tablet, Rfl: 3    multivitamin-Ca-iron-minerals (Tab-A-Cj Womens) 27-0.4 mg tablet, Take 1 tablet by mouth once daily., Disp: , Rfl:     pantoprazole (Protonix) 40 mg EC tablet, Take 1 tablet (40 mg) by mouth once daily., Disp: , Rfl:     warfarin (Coumadin) 5 mg tablet, Take 1 tablet (5 mg) by mouth. Take as directed per [facility] coumadin clinic, Disp: , Rfl:  "                     Assessment/Plan   1. Essential hypertension  Follow Up In Cardiology      2. BMI 40.0-44.9, adult (Multi)                 Scribe Attestation  By signing my name below, I, Emily ELIZABETH RN   , Scribe   attest that this documentation has been prepared under the direction and in the presence of Analy Zhang MD.     Provider Attestation - Scribe documentation    All medical record entries made by the Scribe were at my direction and personally dictated by me. I have reviewed the chart and agree that the record accurately reflects my personal performance of the history, physical exam, discussion and plan.

## 2024-08-14 NOTE — PATIENT INSTRUCTIONS
Please bring all medicines, vitamins, and herbal supplements with you when you come to the office.    Prescriptions will not be filled unless you are compliant with your follow up appointments or have a follow up appointment scheduled as per instruction of your physician. Refills should be requested at the time of your visit.     Keep follow up in October   [As Noted in HPI] : as noted in HPI [Fever] : no fever [Chills] : no chills [Dry Eyes] : no dryness of the eyes [Eye Irritation] : no ~T irritation of the eyes [Nasal Congestion] : no nasal congestion [Epistaxis] : no nosebleeds [Postnasal Drip] : no postnasal drip [Sinus Problems] : no sinus problems [Cough] : no cough [Sputum] : not coughing up ~M sputum [Dyspnea] : no dyspnea [Chest Tightness] : no chest tightness [Pleuritic Pain] : no pleuritic pain [Wheezing] : no wheezing [Hypertension] : no ~T hypertension [Chest Discomfort] : no chest discomfort [Dysrhythmia] : no dysrhythmia [Murmurs] : no murmurs were heard [Palpitations] : no palpitations [Edema] : ~T edema was not present [Hay Fever] : no hay fever [Itchy Eyes] : no itching of ~T the eyes [Reflux] : no reflux [Nausea] : no nausea [Vomiting] : no vomiting [Constipation] : no constipation [Diarrhea] : no diarrhea [Abdominal Pain] : no abdominal pain [Dysuria] : no dysuria [Trauma] : no ~T physical trauma [Fracture] : no fracture [Anemia] : no anemia [Headache] : no headache [Dizziness] : no dizziness [Syncope] : no fainting [Numbness] : no numbness [Paralysis] : no paralysis was seen [Seizures] : no seizures [Depression] : no depression [Anxiety] : no anxiety [Diabetes] : no diabetes mellitus [Thyroid Problem] : no thyroid problem

## 2024-08-14 NOTE — H&P (VIEW-ONLY)
"Subjective   Letty Goncalves is a 69 y.o. female       Chief Complaint    Blood Pressure Check          HPI   Patient is in the office for hypertension management, since we added Exforge 160/5 mg daily her pressure has normalized without any side effects.  She will continue to follow-up with me as scheduled in October 2024, more weight loss was recommended  ROS         Vitals:    08/14/24 1429   BP: 130/70   BP Location: Right arm   Patient Position: Sitting   Pulse: 62   Weight: 113 kg (248 lb 3.2 oz)   Height: 1.6 m (5' 3\")        Objective   Physical Exam    Allergies  Patient has no known allergies.     Current Medications    Current Outpatient Medications:     amlodipine-valsartan (Exforge) 5-160 mg tablet, Take 1 tablet by mouth once daily., Disp: 90 tablet, Rfl: 3    ascorbic acid (Vitamin C) 1,000 mg tablet, Take 1 tablet (1,000 mg) by mouth once daily., Disp: , Rfl:     atorvastatin (Lipitor) 20 mg tablet, Take 1 tablet (20 mg) by mouth once daily., Disp: , Rfl:     calcium polycarbophil (FIBERCON ORAL), Take 1 capsule by mouth once daily., Disp: , Rfl:     cranberry conc-C-bacillus coag (Cranberry Urinary Tract Health) 250-30-3.5 mg tablet, Take 1 tablet by mouth once daily., Disp: , Rfl:     flecainide (Tambocor) 150 mg tablet, Take 1 tablet (150 mg) by mouth 2 times a day., Disp: 60 tablet, Rfl: 11    Lactobac 42-Bifid 6-zowauv-WKY (Probiotic Plus Colostrum) -50 mg powder in packet, Take 1 capsule by mouth once daily., Disp: , Rfl:     metoprolol succinate XL (Toprol-XL) 25 mg 24 hr tablet, Take 1 tablet (25 mg) by mouth once daily., Disp: 90 tablet, Rfl: 3    multivitamin-Ca-iron-minerals (Tab-A-Cj Womens) 27-0.4 mg tablet, Take 1 tablet by mouth once daily., Disp: , Rfl:     pantoprazole (Protonix) 40 mg EC tablet, Take 1 tablet (40 mg) by mouth once daily., Disp: , Rfl:     warfarin (Coumadin) 5 mg tablet, Take 1 tablet (5 mg) by mouth. Take as directed [place] [facility] [place], Disp: , Rfl:  "                     Assessment/Plan   1. Essential hypertension  Follow Up In Cardiology      2. BMI 40.0-44.9, adult (Multi)                 Scribe Attestation  By signing my name below, I, Emily ELIZABETH RN   , Scribe   attest that this documentation has been prepared under the direction and in the presence of Analy Zhang MD.     Provider Attestation - Scribe documentation    All medical record entries made by the Scribe were at my direction and personally dictated by me. I have reviewed the chart and agree that the record accurately reflects my personal performance of the history, physical exam, discussion and plan.

## 2024-08-19 ENCOUNTER — TELEPHONE (OUTPATIENT)
Dept: CARDIOLOGY | Facility: CLINIC | Age: 69
End: 2024-08-19
Payer: MEDICARE

## 2024-08-19 NOTE — TELEPHONE ENCOUNTER
Patient phones in that she received lab work to complete from Dr. Pickering's office prior to her scheduled ablation 8/28. She is inquiring fi she can go to any lab or if she needs to go to a  facility. Informed patient she can take orders to any lab and have results faxed to Dr. Pickering.

## 2024-08-20 LAB
NON-UH HIE BASOPHILS/LEUKOCYTES:NFR.DF:PT:BLD:QN:AUTOMATED COUNT: 0 E9/L (ref 0–0.2)
NON-UH HIE BASOPHILS:NCNC:PT:BLD:QN:AUTOMATED COUNT: 0.5 % (ref 0–2)
NON-UH HIE COAGULATION TISSUE FACTOR INDUCED.INR:RELTIME:PT:PPP:QN:COAG: 2.4
NON-UH HIE COAGULATION TISSUE FACTOR INDUCED:TIME:PT:PPP:QN:COAG: 27.1 SECOND(S) (ref 9.4–12.5)
NON-UH HIE EOSINOPHILS/100 LEUKOCYTES:NFR:PT:BLD:QN:AUTOMATED COUNT: 1.7 % (ref 0–8)
NON-UH HIE EOSINOPHILS:NCNC:PT:BLD:QN:: 0.1 E9/L (ref 0–0.5)
NON-UH HIE ERYTHROCYTE DISTRIBUTION WIDTH:RATIO:PT:RBC:QN:AUTOMATED COUNT: 13.8 % (ref 10.9–14.2)
NON-UH HIE ERYTHROCYTE MEAN CORPUSCULAR HEMOGLOBIN CONCENTRATION:MCNC:PT:RB: 34 GM/DL (ref 31.4–36)
NON-UH HIE ERYTHROCYTE MEAN CORPUSCULAR HEMOGLOBIN:ENTMASS:PT:RBC:QN:AUTOMA: 32.4 PG (ref 27–34)
NON-UH HIE ERYTHROCYTE MEAN CORPUSCULAR VOLUME:ENTVOL:PT:RBC:QN:AUTOMATED C: 95.1 FL (ref 80–100)
NON-UH HIE ERYTHROCYTES:NCNC:PT:BLD:QN:AUTOMATED COUNT: 4 E12/L (ref 4.3–5.9)
NON-UH HIE HEMATOCRIT:VFR:PT:BLD:QN:AUTOMATED COUNT: 37.9 % (ref 34–46)
NON-UH HIE HEMOGLOBIN:MCNC:PT:BLD:QN:: 12.9 GM/DL (ref 12–16)
NON-UH HIE LEUKOCYTES: 5.5 E9/L (ref 4–11)
NON-UH HIE LYMPHOCYTES:NCNC:PT:BLD:QN:: 1.7 E9/L (ref 1–4)
NON-UH HIE LYMPHOCYTES:NCNC:PT:BLD:QN:AUTOMATED COUNT: 31.3 % (ref 14–50)
NON-UH HIE MONOCYTES:NCNC:PT:BLD:QN:AUTOMATED COUNT: 0.6 E9/L (ref 0.2–1)
NON-UH HIE NEUTROPHILS/100 LEUKOCYTES:NFR:PT:BLD:QN:: 56.2 % (ref 36–75)
NON-UH HIE NEUTROPHILS:NCNC:PT:BLD:QN:AUTOMATED COUNT: 3.1 E9/L (ref 2–7.5)
NON-UH HIE PLATELET MEAN VOLUME:ENTVOL:PT:BLD:QN:AUTOMATED COUNT: 9.1 FL (ref 6.4–10.8)
NON-UH HIE PLATELET: 237 E9/L (ref 150–500)

## 2024-08-28 ENCOUNTER — ANESTHESIA (OUTPATIENT)
Dept: CARDIOLOGY | Facility: HOSPITAL | Age: 69
End: 2024-08-28
Payer: MEDICARE

## 2024-08-28 ENCOUNTER — PHARMACY VISIT (OUTPATIENT)
Dept: PHARMACY | Facility: CLINIC | Age: 69
End: 2024-08-28
Payer: COMMERCIAL

## 2024-08-28 ENCOUNTER — ANESTHESIA EVENT (OUTPATIENT)
Dept: CARDIOLOGY | Facility: HOSPITAL | Age: 69
End: 2024-08-28
Payer: MEDICARE

## 2024-08-28 ENCOUNTER — HOSPITAL ENCOUNTER (OUTPATIENT)
Facility: HOSPITAL | Age: 69
Setting detail: OUTPATIENT SURGERY
Discharge: HOME | End: 2024-08-28
Attending: INTERNAL MEDICINE | Admitting: INTERNAL MEDICINE
Payer: MEDICARE

## 2024-08-28 VITALS
WEIGHT: 246 LBS | RESPIRATION RATE: 16 BRPM | DIASTOLIC BLOOD PRESSURE: 73 MMHG | OXYGEN SATURATION: 97 % | SYSTOLIC BLOOD PRESSURE: 147 MMHG | TEMPERATURE: 96.8 F | HEIGHT: 63 IN | BODY MASS INDEX: 43.59 KG/M2 | HEART RATE: 67 BPM

## 2024-08-28 DIAGNOSIS — I48.0 PAF (PAROXYSMAL ATRIAL FIBRILLATION) (MULTI): ICD-10-CM

## 2024-08-28 DIAGNOSIS — I48.0 PAROXYSMAL ATRIAL FIBRILLATION (MULTI): ICD-10-CM

## 2024-08-28 DIAGNOSIS — Z79.01 LONG TERM CURRENT USE OF ANTICOAGULANT THERAPY: ICD-10-CM

## 2024-08-28 PROBLEM — M51.9 LUMBAR DISC DISEASE: Status: ACTIVE | Noted: 2024-08-28

## 2024-08-28 PROBLEM — G89.4 CHRONIC PAIN SYNDROME: Status: ACTIVE | Noted: 2024-08-28

## 2024-08-28 PROBLEM — G89.4 CHRONIC PAIN SYNDROME: Status: RESOLVED | Noted: 2024-08-28 | Resolved: 2024-08-28

## 2024-08-28 PROBLEM — E66.01 CLASS 3 SEVERE OBESITY DUE TO EXCESS CALORIES WITH SERIOUS COMORBIDITY AND BODY MASS INDEX (BMI) OF 40.0 TO 44.9 IN ADULT (MULTI): Status: ACTIVE | Noted: 2024-08-28

## 2024-08-28 PROBLEM — G70.9 NEUROMUSCULAR DISEASE (MULTI): Status: ACTIVE | Noted: 2024-08-28

## 2024-08-28 PROBLEM — G62.9 PERIPHERAL NEUROPATHY: Status: ACTIVE | Noted: 2024-08-28

## 2024-08-28 PROBLEM — E66.813 CLASS 3 SEVERE OBESITY DUE TO EXCESS CALORIES WITH SERIOUS COMORBIDITY AND BODY MASS INDEX (BMI) OF 40.0 TO 44.9 IN ADULT: Status: ACTIVE | Noted: 2024-08-28

## 2024-08-28 PROBLEM — M48.061 SPINAL STENOSIS OF LUMBAR REGION: Status: ACTIVE | Noted: 2024-08-28

## 2024-08-28 PROBLEM — M48.062 SPINAL STENOSIS OF LUMBAR REGION WITH NEUROGENIC CLAUDICATION: Status: ACTIVE | Noted: 2024-08-28

## 2024-08-28 PROBLEM — M19.90 OSTEOARTHRITIS: Status: ACTIVE | Noted: 2024-08-28

## 2024-08-28 PROBLEM — I82.409 DEEP VEIN THROMBOSIS (DVT) OF LOWER EXTREMITY (MULTI): Status: ACTIVE | Noted: 2024-08-28

## 2024-08-28 PROBLEM — D68.59 THROMBOPHILIA (MULTI): Status: ACTIVE | Noted: 2024-08-28

## 2024-08-28 PROBLEM — G89.29 CHRONIC NECK PAIN: Status: ACTIVE | Noted: 2024-08-28

## 2024-08-28 PROBLEM — K21.9 GASTROESOPHAGEAL REFLUX DISEASE WITHOUT ESOPHAGITIS: Status: ACTIVE | Noted: 2024-08-28

## 2024-08-28 PROBLEM — M54.2 CHRONIC NECK PAIN: Status: ACTIVE | Noted: 2024-08-28

## 2024-08-28 PROCEDURE — 2500000004 HC RX 250 GENERAL PHARMACY W/ HCPCS (ALT 636 FOR OP/ED)

## 2024-08-28 PROCEDURE — 3700000002 HC GENERAL ANESTHESIA TIME - EACH INCREMENTAL 1 MINUTE: Performed by: INTERNAL MEDICINE

## 2024-08-28 PROCEDURE — 93655 ICAR CATH ABLTJ DSCRT ARRHYT: CPT | Performed by: INTERNAL MEDICINE

## 2024-08-28 PROCEDURE — RXMED WILLOW AMBULATORY MEDICATION CHARGE

## 2024-08-28 PROCEDURE — 7100000010 HC PHASE TWO TIME - EACH INCREMENTAL 1 MINUTE: Performed by: INTERNAL MEDICINE

## 2024-08-28 PROCEDURE — 2500000004 HC RX 250 GENERAL PHARMACY W/ HCPCS (ALT 636 FOR OP/ED): Performed by: INTERNAL MEDICINE

## 2024-08-28 PROCEDURE — 93656 COMPRE EP EVAL ABLTJ ATR FIB: CPT | Performed by: INTERNAL MEDICINE

## 2024-08-28 PROCEDURE — 2720000007 HC OR 272 NO HCPCS: Performed by: INTERNAL MEDICINE

## 2024-08-28 PROCEDURE — C1759 CATH, INTRA ECHOCARDIOGRAPHY: HCPCS | Performed by: INTERNAL MEDICINE

## 2024-08-28 PROCEDURE — C1731 CATH, EP, 20 OR MORE ELEC: HCPCS | Performed by: INTERNAL MEDICINE

## 2024-08-28 PROCEDURE — 85347 COAGULATION TIME ACTIVATED: CPT | Performed by: INTERNAL MEDICINE

## 2024-08-28 PROCEDURE — 2500000004 HC RX 250 GENERAL PHARMACY W/ HCPCS (ALT 636 FOR OP/ED): Performed by: ANESTHESIOLOGIST ASSISTANT

## 2024-08-28 PROCEDURE — 2500000001 HC RX 250 WO HCPCS SELF ADMINISTERED DRUGS (ALT 637 FOR MEDICARE OP): Performed by: STUDENT IN AN ORGANIZED HEALTH CARE EDUCATION/TRAINING PROGRAM

## 2024-08-28 PROCEDURE — C1760 CLOSURE DEV, VASC: HCPCS | Performed by: INTERNAL MEDICINE

## 2024-08-28 PROCEDURE — 2500000005 HC RX 250 GENERAL PHARMACY W/O HCPCS: Performed by: ANESTHESIOLOGIST ASSISTANT

## 2024-08-28 PROCEDURE — G0269 OCCLUSIVE DEVICE IN VEIN ART: HCPCS | Mod: 59 | Performed by: INTERNAL MEDICINE

## 2024-08-28 PROCEDURE — 7100000009 HC PHASE TWO TIME - INITIAL BASE CHARGE: Performed by: INTERNAL MEDICINE

## 2024-08-28 PROCEDURE — 3700000001 HC GENERAL ANESTHESIA TIME - INITIAL BASE CHARGE: Performed by: INTERNAL MEDICINE

## 2024-08-28 PROCEDURE — 2780000003 HC OR 278 NO HCPCS: Performed by: INTERNAL MEDICINE

## 2024-08-28 PROCEDURE — 2500000005 HC RX 250 GENERAL PHARMACY W/O HCPCS

## 2024-08-28 PROCEDURE — C1732 CATH, EP, DIAG/ABL, 3D/VECT: HCPCS | Performed by: INTERNAL MEDICINE

## 2024-08-28 PROCEDURE — 76937 US GUIDE VASCULAR ACCESS: CPT | Performed by: INTERNAL MEDICINE

## 2024-08-28 PROCEDURE — C1766 INTRO/SHEATH,STRBLE,NON-PEEL: HCPCS | Performed by: INTERNAL MEDICINE

## 2024-08-28 RX ORDER — PANTOPRAZOLE SODIUM 40 MG/1
40 TABLET, DELAYED RELEASE ORAL 2 TIMES DAILY
Qty: 60 TABLET | Refills: 0 | Status: SHIPPED | OUTPATIENT
Start: 2024-08-28 | End: 2024-09-28

## 2024-08-28 RX ORDER — HEPARIN SODIUM 10000 [USP'U]/100ML
INJECTION, SOLUTION INTRAVENOUS CONTINUOUS PRN
Status: DISCONTINUED | OUTPATIENT
Start: 2024-08-28 | End: 2024-08-28 | Stop reason: HOSPADM

## 2024-08-28 RX ORDER — MIDAZOLAM HYDROCHLORIDE 1 MG/ML
INJECTION INTRAMUSCULAR; INTRAVENOUS AS NEEDED
Status: DISCONTINUED | OUTPATIENT
Start: 2024-08-28 | End: 2024-08-28

## 2024-08-28 RX ORDER — PANTOPRAZOLE SODIUM 40 MG/1
40 TABLET, DELAYED RELEASE ORAL 2 TIMES DAILY
Qty: 60 TABLET | Refills: 0 | Status: SHIPPED | OUTPATIENT
Start: 2024-08-28 | End: 2024-09-27

## 2024-08-28 RX ORDER — PHENYLEPHRINE 10 MG/250 ML(40 MCG/ML)IN 0.9 % SOD.CHLORIDE INTRAVENOUS
CONTINUOUS PRN
Status: DISCONTINUED | OUTPATIENT
Start: 2024-08-28 | End: 2024-08-28

## 2024-08-28 RX ORDER — ROCURONIUM BROMIDE 10 MG/ML
INJECTION, SOLUTION INTRAVENOUS AS NEEDED
Status: DISCONTINUED | OUTPATIENT
Start: 2024-08-28 | End: 2024-08-28

## 2024-08-28 RX ORDER — LIDOCAINE HYDROCHLORIDE 20 MG/ML
INJECTION, SOLUTION EPIDURAL; INFILTRATION; INTRACAUDAL; PERINEURAL AS NEEDED
Status: DISCONTINUED | OUTPATIENT
Start: 2024-08-28 | End: 2024-08-28

## 2024-08-28 RX ORDER — SODIUM CHLORIDE, SODIUM LACTATE, POTASSIUM CHLORIDE, CALCIUM CHLORIDE 600; 310; 30; 20 MG/100ML; MG/100ML; MG/100ML; MG/100ML
INJECTION, SOLUTION INTRAVENOUS CONTINUOUS PRN
Status: DISCONTINUED | OUTPATIENT
Start: 2024-08-28 | End: 2024-08-28

## 2024-08-28 RX ORDER — FENTANYL CITRATE 50 UG/ML
INJECTION, SOLUTION INTRAMUSCULAR; INTRAVENOUS AS NEEDED
Status: DISCONTINUED | OUTPATIENT
Start: 2024-08-28 | End: 2024-08-28

## 2024-08-28 RX ORDER — PROTAMINE SULFATE 10 MG/ML
INJECTION, SOLUTION INTRAVENOUS AS NEEDED
Status: DISCONTINUED | OUTPATIENT
Start: 2024-08-28 | End: 2024-08-28

## 2024-08-28 RX ORDER — PROPOFOL 10 MG/ML
INJECTION, EMULSION INTRAVENOUS AS NEEDED
Status: DISCONTINUED | OUTPATIENT
Start: 2024-08-28 | End: 2024-08-28

## 2024-08-28 RX ORDER — PHENYLEPHRINE HCL IN 0.9% NACL 0.4MG/10ML
SYRINGE (ML) INTRAVENOUS AS NEEDED
Status: DISCONTINUED | OUTPATIENT
Start: 2024-08-28 | End: 2024-08-28

## 2024-08-28 RX ORDER — HEPARIN SODIUM 1000 [USP'U]/ML
INJECTION, SOLUTION INTRAVENOUS; SUBCUTANEOUS AS NEEDED
Status: DISCONTINUED | OUTPATIENT
Start: 2024-08-28 | End: 2024-08-28 | Stop reason: HOSPADM

## 2024-08-28 SDOH — HEALTH STABILITY: MENTAL HEALTH: CURRENT SMOKER: 0

## 2024-08-28 ASSESSMENT — PAIN SCALES - GENERAL: PAIN_LEVEL: 2

## 2024-08-28 ASSESSMENT — COLUMBIA-SUICIDE SEVERITY RATING SCALE - C-SSRS
2. HAVE YOU ACTUALLY HAD ANY THOUGHTS OF KILLING YOURSELF?: NO
6. HAVE YOU EVER DONE ANYTHING, STARTED TO DO ANYTHING, OR PREPARED TO DO ANYTHING TO END YOUR LIFE?: NO
1. IN THE PAST MONTH, HAVE YOU WISHED YOU WERE DEAD OR WISHED YOU COULD GO TO SLEEP AND NOT WAKE UP?: NO

## 2024-08-28 NOTE — ANESTHESIA PROCEDURE NOTES
Peripheral IV  Date/Time: 8/28/2024 2:15 PM  Inserted by: Ash Godinez MD    Placement  Needle size: 18 G  Laterality: right  Location: hand  Local anesthetic: none  Site prep: alcohol  Technique: anatomical landmarks  Attempts: 1

## 2024-08-28 NOTE — ANESTHESIA PROCEDURE NOTES
Arterial Line:    Date/Time: 8/28/2024 2:05 PM    Staffing  Performed: SRNA   Authorized by: Ash Godinez MD    Performed by: Amarilys Bruce    An arterial line was placed. Procedure performed using ultrasound guidance and surface landmarks.in the OR for the following indication(s): continuous blood pressure monitoring and blood sampling needed.    A 20 gauge (size), 1 and 3/4 inch (length), Angiocath (type) catheter was placed into the Left radial artery, secured by Tegaderm,   Seldinger technique used.  Events:  patient tolerated procedure well with no complications.      Additional notes:  Arterial line inserted easily/atraumatically, no complications noted.

## 2024-08-28 NOTE — ANESTHESIA PREPROCEDURE EVALUATION
Patient: Letty Goncalves    Procedure Information       Date/Time: 08/28/24 1230    Procedure: Ablation A-Fib Paroxysmal - CARTO/ANY EP LAB    [place] [facility] [place] [facility] / [facility] [place] MAT 3529 Cardiac Cath Lab    Providers: Andrew Pickering MD            Relevant Problems   Cardiac   (+) Chest pain   (+) Essential hypertension   (+) PAF (paroxysmal atrial fibrillation) (Multi)   (+) Paroxysmal atrial fibrillation (Multi)      Neuro  Hx chronic back/neck pain s/p prior C-spine fusion surgery and lumbar spinal cord stimulator insertion   (+) Neuromuscular disease (Multi)   (+) Peripheral neuropathy      GI  Prior heavy drinker, quit 1993   (+) Gastroesophageal reflux disease without esophagitis      Endocrine  Hx lymphedema   (+) Class 3 severe obesity due to excess calories with serious comorbidity and body mass index (BMI) of 40.0 to 44.9 in adult (Multi)      Hematology  Hx DVT's 1974   (+) Deep vein thrombosis (DVT) of lower extremity (Multi)   (+) Long term current use of anticoagulant therapy   (+) Thrombophilia (Multi) (Hx DVT's 1974, no PE)      Musculoskeletal   (+) Chronic neck pain   (+) Chronic pain syndrome (Resolved)   (+) Lumbar disc disease   (+) Osteoarthritis   (+) Spinal stenosis of lumbar region with neurogenic claudication       Clinical information reviewed:  Coumadin -- last dose 8/27 at 17:00    Stress test 9/19/2022 -- EF 65%, no ischemia or old MI noted    NPO Detail:  NPO/Void Status  Date of Last Liquid: 08/28/24  Time of Last Liquid: 0000  Date of Last Solid: 08/28/24  Time of Last Solid: 0000         Physical Exam    Airway  Mallampati: II  TM distance: >3 FB  Neck ROM: limited     Cardiovascular    Dental   (+) implants     Pulmonary   Breath sounds clear to auscultation     Abdominal   Abdomen: soft  Bowel sounds: normal     Other findings: Mildly limited neck extension s/p C-spine fusion surgery;  Few dental implants in place, pt not wearing her dentures now, remaining dentition  solid          Anesthesia Plan    History of general anesthesia?: yes  History of complications of general anesthesia?: no    ASA 3     general   (Plan GETA/ PIVx2/ A-line)  The patient is not a current smoker.  Patient was previously instructed to abstain from smoking on day of procedure.  Patient did not smoke on day of procedure.  Education provided regarding risk of obstructive sleep apnea.  intravenous induction   Anesthetic plan and risks discussed with patient and spouse.  Use of blood products discussed with patient and spouse who consented to blood products.    Plan discussed with CRNA and attending.

## 2024-08-28 NOTE — ANESTHESIA PROCEDURE NOTES
Airway  Date/Time: 8/28/2024 1:54 PM  Urgency: elective    Airway not difficult    Staffing  Performed: JUNITO   Authorized by: Ash Godinez MD    Performed by: Amarilys Bruce  Patient location during procedure: OR    Indications and Patient Condition  Indications for airway management: anesthesia and airway protection  Spontaneous Ventilation: absent  Sedation level: deep  Preoxygenated: yes  Patient position: sniffing  Mask difficulty assessment: 1 - vent by mask    Final Airway Details  Final airway type: endotracheal airway      Successful airway: ETT  Cuffed: yes   Successful intubation technique: direct laryngoscopy  Facilitating devices/methods: intubating stylet  Endotracheal tube insertion site: oral  Blade: Katie  Blade size: #3  ETT size (mm): 7.0  Cormack-Lehane Classification: grade I - full view of glottis  Placement verified by: chest auscultation and capnometry   Measured from: teeth  ETT to teeth (cm): 22  Number of attempts at approach: 1  Ventilation between attempts: none  Number of other approaches attempted: 0    Additional Comments  Atraumatic intubation with Ge 3. Lips and teeth intact.

## 2024-08-28 NOTE — DISCHARGE INSTRUCTIONS
INSTRUCTIONS AFTER ABLATION PROCEDURE:    * Stop taking warfarin; apixaban (Eliquis) will replace this as your blood thinner medication. We placed a referral to clinical pharmacy to try to find an affordable way for you to take your apixaban. If you cannot find a way to continue taking apixaban, then talk to your doctor about getting back on warfarin before the apixaban runs out (you have 30 days worth of apixaban).    * You will need to continue blood thinner apixaban until instructed otherwise. It is important not to interrupt blood thinner for any reason (other than an emergency) during the first 30 days after ablation.    * You will be on Pantoprazole (a heartburn medicine) twice a day for 4 weeks to protect the esophagus as it can become irritated with ablation. It is very important that you take this medication.    * All other medications will generally remain the same unless you are told otherwise.  Resume taking your home medications today (including blood thinner) as listed on the discharge instructions.    * In the first week post-ablation you should take it easy. No heavy lifting or heavy exercise, no treadmill. You can use the stairs if needed but go slowly and minimize the number of times up and down.    * Some minor bruising is common at each groin access site with minor soreness as if you had banged the area. Bruising may occasionally be seen to extend down the leg. This is normal as is an occasional small quarter sized bump in the area. If larger swelling or more significant pain occurs at the area, please contact the office or go the nearest Emergency Room.    * You may have some minor chest pain for the next week or so. The pain will often worsen with a deep breath and be better when leaning forward. This is pericardial chest pain from the ablation and is generally not of concern. It should resolve within a week although it might increase for a day or so after the ablation.    * If you develop  unexplained fevers exceeding 100 degrees anytime within the first 3 weeks post-ablation, you need to contact the office. Low grade fevers of around 99 degrees are common in the first day or so post-ablation.    * Atrial fibrillation (AFib) can recur in all patients who undergo this ablation for up to 4-8 weeks post-ablation. The ablation itself can cause inflammation (pericarditis) in the atria and this can cause AFib. Some patients will actually experience an increased amount of atrial arrhythmia early after ablation. Approximately 1/3 of patients will have this early recurrence of AFib. Medications should be continued and your heart rate controlled. Nothing else needs be done initially except waiting as in many cases these episodes of AFib will prove self limited.    * Continue to follow up with your primary care physician, primary cardiologist, and any other specialists you normally see.    * No driving for 2 days post procedure (IF you were driving prior to procedure)    *Diet: Heart healthy    Call Provider If:  Breathing faster than normal.     Fever of 100.4 F (38 C) or higher.     Chills.     Any new concerning symptoms.     Passing out.     Patient Instructions, Next 24 hours:  DO NOT drive a car, operate machinery or power tools.  It is recommended that a responsible adult be with you for the first 24 hours.     DO NOT drink any alcoholic drinks or take any non-prescriptive medications that contain alcohol for the first 24 hours.     DO NOT make any important decisions for the first 24 hours.    Activity:  You are advised to go directly home from the hospital.     DO NOT lift anything heavier than 10 pounds for one week, this allows for proper healing of the groin.     No excessive exercise or treadmill use for one week. You may walk and do stairs, slowly.     No sexual activities for 24 hours after you arrive home.    Wound Care:  If slight bleeding should occur at groin site, lie down and have someone  apply firm pressure just above the puncture site for 5 minutes.  If it continues or is profuse, call 911. Always notify your doctor if bleeding occurs.     Keep site clean and dry. Let air dry or you may use a simple bandaid.     Gently cleanse the puncture site in your groin with soap and water only.     You may experience some tenderness, bruising or minimal inflammation.  If you have any concerns, you may contact the EP Lab or if any of these symptoms become excessive, contact your electrophysiologist or go to the emergency room.     No tub baths, soaking, hot tubs, or swimming for one week.     May shower the next day after your procedure.    Other Instructions:  If you have any questions about the effects of the sedative drugs or groin care, call the physician who performed your procedure.    FOLLOW UP:  1) Primary care physician 2 weeks--call to schedule    2) Maddie Parks CNP ( Electrophysiology) 1 month after ablation   will notify you of appointment. If you haven't heard in 1 week, please call [phone]

## 2024-08-29 NOTE — ANESTHESIA POSTPROCEDURE EVALUATION
Patient: Letty Goncalves    Procedure Summary       Date: 08/28/24 [place] [facility] [place] [facility] [place] [facility] Cardiac Cath Lab    Anesthesia Start: 1337 Anesthesia Stop: 1812    Procedure: Ablation A-Fib Paroxysmal Diagnosis:       PAF (paroxysmal atrial fibrillation) (Multi)      (Paroxysmal atrial fibrillation (Multi) [I48.0])      (Long term current use of anticoagulant therapy [Z79.01])      (PAF (paroxysmal atrial fibrillation) (Multi) [I48.0])    Providers: Andrew Pickering MD Responsible Provider: Fuentes Hopkins MD    Anesthesia Type: general ASA Status: 3            Anesthesia Type: general    Vitals Value Taken Time   /73 08/28/24 1816   Temp 36 °C (96.8 °F) 08/28/24 1816   Pulse 67 08/28/24 1816   Resp 16 08/28/24 1816   SpO2 97 % 08/28/24 1816       Anesthesia Post Evaluation    Patient location during evaluation: bedside  Patient participation: complete - patient participated  Level of consciousness: awake  Pain score: 2  Pain management: adequate  Airway patency: patent  Cardiovascular status: acceptable, hemodynamically stable and stable  Respiratory status: acceptable and face mask  Hydration status: acceptable  Postoperative Nausea and Vomiting: none        There were no known notable events for this encounter.

## 2024-09-30 ENCOUNTER — PATIENT MESSAGE (OUTPATIENT)
Dept: CARDIOLOGY | Facility: CLINIC | Age: 69
End: 2024-09-30
Payer: MEDICARE

## 2024-10-01 ENCOUNTER — APPOINTMENT (OUTPATIENT)
Dept: CARDIOLOGY | Facility: CLINIC | Age: 69
End: 2024-10-01
Payer: MEDICARE

## 2024-10-01 VITALS
BODY MASS INDEX: 42.88 KG/M2 | DIASTOLIC BLOOD PRESSURE: 60 MMHG | WEIGHT: 242 LBS | SYSTOLIC BLOOD PRESSURE: 96 MMHG | HEART RATE: 63 BPM | HEIGHT: 63 IN

## 2024-10-01 DIAGNOSIS — E66.01 MORBID OBESITY (MULTI): ICD-10-CM

## 2024-10-01 DIAGNOSIS — I10 ESSENTIAL HYPERTENSION: ICD-10-CM

## 2024-10-01 DIAGNOSIS — I48.0 PAROXYSMAL ATRIAL FIBRILLATION (MULTI): Primary | ICD-10-CM

## 2024-10-01 DIAGNOSIS — Z78.9 NEVER SMOKED TOBACCO: ICD-10-CM

## 2024-10-01 DIAGNOSIS — Z79.01 LONG TERM CURRENT USE OF ANTICOAGULANT THERAPY: ICD-10-CM

## 2024-10-01 DIAGNOSIS — Z79.899 HIGH RISK MEDICATION USE: ICD-10-CM

## 2024-10-01 DIAGNOSIS — E78.5 DYSLIPIDEMIA: ICD-10-CM

## 2024-10-01 PROCEDURE — 3074F SYST BP LT 130 MM HG: CPT | Performed by: INTERNAL MEDICINE

## 2024-10-01 PROCEDURE — 99214 OFFICE O/P EST MOD 30 MIN: CPT | Performed by: INTERNAL MEDICINE

## 2024-10-01 PROCEDURE — 3078F DIAST BP <80 MM HG: CPT | Performed by: INTERNAL MEDICINE

## 2024-10-01 PROCEDURE — 1036F TOBACCO NON-USER: CPT | Performed by: INTERNAL MEDICINE

## 2024-10-01 PROCEDURE — 3008F BODY MASS INDEX DOCD: CPT | Performed by: INTERNAL MEDICINE

## 2024-10-01 PROCEDURE — 93000 ELECTROCARDIOGRAM COMPLETE: CPT | Performed by: INTERNAL MEDICINE

## 2024-10-01 RX ORDER — VALSARTAN 160 MG/1
160 TABLET ORAL DAILY
Qty: 90 TABLET | Refills: 3 | Status: SHIPPED | OUTPATIENT
Start: 2024-10-01 | End: 2025-10-01

## 2024-10-01 NOTE — LETTER
October 1, 2024     Senthil Bruno DO  [address] [place] [address]    Patient: Letty Goncalves   YOB: 1955   Date of Visit: 10/1/2024       Dear Dr. Senthil Bruno DO:    Thank you for referring Letty Goncalves to me for evaluation. Below are my notes for this consultation.  If you have questions, please do not hesitate to call me. I look forward to following your patient along with you.       Sincerely,     Analy Zhang MD      CC: No Recipients  ______________________________________________________________________________________    Subjective   Letty Goncalves is a 69 y.o. female       Chief Complaint    Follow-up          HPI   69-year-old female who came with her  Tawanda for follow-up.  She has followed previously with Dr. James.  Late August 2024 she underwent ablation at [facility] for atrial fibrillation with no complications.  She is currently on flecainide.  She developed severe rash related to Eliquis a couple days ago which was stopped and was replaced with Coumadin.  Her pressure is on the low side and because of this adjustment medical therapy was made.  She is nondiabetic non-smoker and with no sleep apnea.  Examination was unremarkable today except for obesity.  EKG revealed normal sinus rhythm with first-degree AV block.    Assessment/recommendations:    1-paroxysmal atrial fibrillation, status post ablation August 2024 at [facility].  For the time being will be kept on flecainide and metoprolol plus long-term anticoagulation with Coumadin for next several months.  EKG today reveals normal sinus rhythm with first-degree AV block.  Will decide after next visit in 6-month on whether to continue antiarrhythmics/anticoagulation.  2-essential hypertension, her blood pressure is on the low side today.  Will eliminate amlodipine and leave her on valsartan.  3-hyperlipidemia on statin therapy to be monitored  4-high risk medication with  "antiarrhythmics anticoagulants to be monitored  4-morbid obesity, lifestyle changes to lose weight were recommended  5-intolerance to Eliquis leading to severe rash.  Review of Systems   All other systems reviewed and are negative.           Vitals:    10/01/24 0914   BP: 96/60   BP Location: Right arm   Patient Position: Sitting   Pulse: 63   Weight: 110 kg (242 lb)   Height: 1.6 m (5' 3\")      EKG done in office today      Objective   Physical Exam  Constitutional:       Appearance: Normal appearance.   HENT:      Nose: Nose normal.   Neck:      Vascular: No carotid bruit.   Cardiovascular:      Rate and Rhythm: Normal rate.      Pulses: Normal pulses.      Heart sounds: Normal heart sounds.   Pulmonary:      Effort: Pulmonary effort is normal.   Abdominal:      General: Bowel sounds are normal.      Palpations: Abdomen is soft.   Musculoskeletal:         General: Normal range of motion.      Cervical back: Normal range of motion.      Right lower leg: No edema.      Left lower leg: No edema.   Skin:     General: Skin is warm and dry.   Neurological:      General: No focal deficit present.      Mental Status: She is alert.   Psychiatric:         Mood and Affect: Mood normal.         Behavior: Behavior normal.         Thought Content: Thought content normal.         Judgment: Judgment normal.         Allergies  Eliquis [apixaban]     Current Medications    Current Outpatient Medications:   •  ascorbic acid (Vitamin C) 1,000 mg tablet, Take 1 tablet (1,000 mg) by mouth once daily., Disp: , Rfl:   •  atorvastatin (Lipitor) 20 mg tablet, Take 1 tablet (20 mg) by mouth once daily., Disp: , Rfl:   •  calcium polycarbophil (FIBERCON ORAL), Take 1 capsule by mouth once daily., Disp: , Rfl:   •  cranberry conc-C-bacillus coag (Cranberry Urinary Tract Health) 250-30-3.5 mg tablet, Take 1 tablet by mouth once daily., Disp: , Rfl:   •  flecainide (Tambocor) 150 mg tablet, Take 1 tablet (150 mg) by mouth 2 times a day., Disp: " 60 tablet, Rfl: 11  •  Lactobac 42-Bifid 5-[id number]-GIH (Probiotic Plus Colostrum) -50 mg powder in packet, Take 1 capsule by mouth once daily., Disp: , Rfl:   •  metoprolol succinate XL (Toprol-XL) 25 mg 24 hr tablet, Take 1 tablet (25 mg) by mouth once daily., Disp: 90 tablet, Rfl: 3  •  multivitamin-Ca-iron-minerals (Tab-A-Cj Womens) 27-0.4 mg tablet, Take 1 tablet by mouth once daily., Disp: , Rfl:   •  pantoprazole (Protonix) 40 mg EC tablet, Take 1 tablet (40 mg) by mouth 2 times a day., Disp: 60 tablet, Rfl: 0  •  warfarin sodium (WARFARIN ORAL), Take by mouth. As directed per [facility] coumadin clinic, Disp: , Rfl:   •  valsartan (Diovan) 160 mg tablet, Take 1 tablet (160 mg) by mouth once daily., Disp: 90 tablet, Rfl: 3                     Assessment/Plan   1. Paroxysmal atrial fibrillation (Multi)  Follow Up In Cardiology    ECG 12 Lead      2. Essential hypertension  Follow Up In Cardiology    valsartan (Diovan) 160 mg tablet      3. Long term current use of anticoagulant therapy        4. Never smoked tobacco        5. Morbid obesity (Multi)        6. High risk medication use        7. Dyslipidemia                 Scribe Attestation  By signing my name below, Karli HENRIQUEZ LPN, Scribe   attest that this documentation has been prepared under the direction and in the presence of Analy Zhang MD.     Provider Attestation - Scribe documentation    All medical record entries made by the Scribe were at my direction and personally dictated by me. I have reviewed the chart and agree that the record accurately reflects my personal performance of the history, physical exam, discussion and plan.

## 2024-10-01 NOTE — PATIENT INSTRUCTIONS
Please bring all medicines, vitamins, and herbal supplements with you when you come to the office.    Prescriptions will not be filled unless you are compliant with your follow up appointments or have a follow up appointment scheduled as per instruction of your physician. Refills should be requested at the time of your visit.     BMI was above normal measurement. Current weight: 110 kg (242 lb)  Weight change since last visit (-) denotes wt loss -4 lbs   Weight loss needed to achieve BMI 25: 101.2 Lbs  Weight loss needed to achieve BMI 30: 73 Lbs  Provided instructions on dietary changes  Provided instructions on exercise.    Stop Exforge  Start Valsartan 160 mg one daily  [facility] -check Friday  Follow up 6 months

## 2024-10-01 NOTE — PROGRESS NOTES
"Subjective   Letty Goncalves is a 69 y.o. female       Chief Complaint    Follow-up          HPI   69-year-old female who came with her  Tawanda for follow-up.  She has followed previously with Dr. James.  Late August 2024 she underwent ablation at  for atrial fibrillation with no complications.  She is currently on flecainide.  She developed severe rash related to Eliquis a couple days ago which was stopped and was replaced with Coumadin.  Her pressure is on the low side and because of this adjustment medical therapy was made.  She is nondiabetic non-smoker and with no sleep apnea.  Examination was unremarkable today except for obesity.  EKG revealed normal sinus rhythm with first-degree AV block.    Assessment/recommendations:    1-paroxysmal atrial fibrillation, status post ablation August 2024 at .  For the time being will be kept on flecainide and metoprolol plus long-term anticoagulation with Coumadin for next several months.  EKG today reveals normal sinus rhythm with first-degree AV block.  Will decide after next visit in 6-month on whether to continue antiarrhythmics/anticoagulation.  2-essential hypertension, her blood pressure is on the low side today.  Will eliminate amlodipine and leave her on valsartan.  3-hyperlipidemia on statin therapy to be monitored  4-high risk medication with antiarrhythmics anticoagulants to be monitored  4-morbid obesity, lifestyle changes to lose weight were recommended  5-intolerance to Eliquis leading to severe rash.  Review of Systems   All other systems reviewed and are negative.           Vitals:    10/01/24 0914   BP: 96/60   BP Location: Right arm   Patient Position: Sitting   Pulse: 63   Weight: 110 kg (242 lb)   Height: 1.6 m (5' 3\")      EKG done in office today      Objective   Physical Exam  Constitutional:       Appearance: Normal appearance.   HENT:      Nose: Nose normal.   Neck:      Vascular: No carotid bruit. "   Cardiovascular:      Rate and Rhythm: Normal rate.      Pulses: Normal pulses.      Heart sounds: Normal heart sounds.   Pulmonary:      Effort: Pulmonary effort is normal.   Abdominal:      General: Bowel sounds are normal.      Palpations: Abdomen is soft.   Musculoskeletal:         General: Normal range of motion.      Cervical back: Normal range of motion.      Right lower leg: No edema.      Left lower leg: No edema.   Skin:     General: Skin is warm and dry.   Neurological:      General: No focal deficit present.      Mental Status: She is alert.   Psychiatric:         Mood and Affect: Mood normal.         Behavior: Behavior normal.         Thought Content: Thought content normal.         Judgment: Judgment normal.         Allergies  Eliquis [apixaban]     Current Medications    Current Outpatient Medications:     ascorbic acid (Vitamin C) 1,000 mg tablet, Take 1 tablet (1,000 mg) by mouth once daily., Disp: , Rfl:     atorvastatin (Lipitor) 20 mg tablet, Take 1 tablet (20 mg) by mouth once daily., Disp: , Rfl:     calcium polycarbophil (FIBERCON ORAL), Take 1 capsule by mouth once daily., Disp: , Rfl:     cranberry conc-C-bacillus coag (Cranberry Urinary Tract Health) 250-30-3.5 mg tablet, Take 1 tablet by mouth once daily., Disp: , Rfl:     flecainide (Tambocor) 150 mg tablet, Take 1 tablet (150 mg) by mouth 2 times a day., Disp: 60 tablet, Rfl: 11    Lactobac 42-Bifid 9-[id number]-CCW (Probiotic Plus Colostrum) -50 mg powder in packet, Take 1 capsule by mouth once daily., Disp: , Rfl:     metoprolol succinate XL (Toprol-XL) 25 mg 24 hr tablet, Take 1 tablet (25 mg) by mouth once daily., Disp: 90 tablet, Rfl: 3    multivitamin-Ca-iron-minerals (Tab-A-Cj Womens) 27-0.4 mg tablet, Take 1 tablet by mouth once daily., Disp: , Rfl:     pantoprazole (Protonix) 40 mg EC tablet, Take 1 tablet (40 mg) by mouth 2 times a day., Disp: 60 tablet, Rfl: 0    warfarin sodium (WARFARIN ORAL), Take by mouth. As directed  [place] [facility], Disp: , Rfl:     valsartan (Diovan) 160 mg tablet, Take 1 tablet (160 mg) by mouth once daily., Disp: 90 tablet, Rfl: 3                     Assessment/Plan   1. Paroxysmal atrial fibrillation (Multi)  Follow Up In Cardiology    ECG 12 Lead      2. Essential hypertension  Follow Up In Cardiology    valsartan (Diovan) 160 mg tablet      3. Long term current use of anticoagulant therapy        4. Never smoked tobacco        5. Morbid obesity (Multi)        6. High risk medication use        7. Dyslipidemia                 Scribe Attestation  By signing my name below, Karli HENRIQUEZ LPN, Scribe   attest that this documentation has been prepared under the direction and in the presence of Analy Zhang MD.     Provider Attestation - Scribe documentation    All medical record entries made by the Scribe were at my direction and personally dictated by me. I have reviewed the chart and agree that the record accurately reflects my personal performance of the history, physical exam, discussion and plan.

## 2025-03-06 DIAGNOSIS — I48.0 PAROXYSMAL ATRIAL FIBRILLATION (MULTI): ICD-10-CM

## 2025-03-07 RX ORDER — FLECAINIDE ACETATE 150 MG/1
150 TABLET ORAL 2 TIMES DAILY
Qty: 180 TABLET | Refills: 3 | Status: SHIPPED | OUTPATIENT
Start: 2025-03-07 | End: 2026-03-07

## 2025-05-15 ENCOUNTER — APPOINTMENT (OUTPATIENT)
Dept: CARDIOLOGY | Facility: CLINIC | Age: 70
End: 2025-05-15
Payer: MEDICARE

## 2025-05-15 VITALS
HEIGHT: 63 IN | BODY MASS INDEX: 40.57 KG/M2 | HEART RATE: 58 BPM | SYSTOLIC BLOOD PRESSURE: 108 MMHG | DIASTOLIC BLOOD PRESSURE: 64 MMHG | WEIGHT: 229 LBS

## 2025-05-15 DIAGNOSIS — I10 ESSENTIAL HYPERTENSION: ICD-10-CM

## 2025-05-15 DIAGNOSIS — Z78.9 NEVER SMOKED TOBACCO: ICD-10-CM

## 2025-05-15 DIAGNOSIS — E66.01 MORBID OBESITY (MULTI): ICD-10-CM

## 2025-05-15 DIAGNOSIS — E78.2 MIXED HYPERLIPIDEMIA: ICD-10-CM

## 2025-05-15 DIAGNOSIS — Z79.01 LONG TERM CURRENT USE OF ANTICOAGULANT THERAPY: ICD-10-CM

## 2025-05-15 DIAGNOSIS — I48.0 PAROXYSMAL ATRIAL FIBRILLATION (MULTI): Primary | ICD-10-CM

## 2025-05-15 PROCEDURE — 1159F MED LIST DOCD IN RCRD: CPT | Performed by: INTERNAL MEDICINE

## 2025-05-15 PROCEDURE — 3078F DIAST BP <80 MM HG: CPT | Performed by: INTERNAL MEDICINE

## 2025-05-15 PROCEDURE — 3008F BODY MASS INDEX DOCD: CPT | Performed by: INTERNAL MEDICINE

## 2025-05-15 PROCEDURE — 1036F TOBACCO NON-USER: CPT | Performed by: INTERNAL MEDICINE

## 2025-05-15 PROCEDURE — 99214 OFFICE O/P EST MOD 30 MIN: CPT | Performed by: INTERNAL MEDICINE

## 2025-05-15 PROCEDURE — 3074F SYST BP LT 130 MM HG: CPT | Performed by: INTERNAL MEDICINE

## 2025-05-15 PROCEDURE — 93000 ELECTROCARDIOGRAM COMPLETE: CPT | Performed by: INTERNAL MEDICINE

## 2025-05-15 RX ORDER — DICYCLOMINE HYDROCHLORIDE 20 MG/1
20 TABLET ORAL
COMMUNITY

## 2025-05-15 RX ORDER — METOPROLOL SUCCINATE 25 MG/1
25 TABLET, EXTENDED RELEASE ORAL DAILY
Qty: 90 TABLET | Refills: 3 | Status: SHIPPED | OUTPATIENT
Start: 2025-05-15 | End: 2026-05-15

## 2025-05-15 ASSESSMENT — ENCOUNTER SYMPTOMS
PALPITATIONS: 1
DIZZINESS: 1

## 2025-05-15 NOTE — LETTER
May 15, 2025     Senthil Bruno DO  [address] [place] [address]    Patient: Letty Goncalves   YOB: 1955   Date of Visit: 5/15/2025       Dear Dr. Senthil Bruno DO:    Thank you for referring Letty Goncalves to me for evaluation. Below are my notes for this consultation.  If you have questions, please do not hesitate to call me. I look forward to following your patient along with you.       Sincerely,     Analy Zhang MD      CC: No Recipients  ______________________________________________________________________________________    HPI  Patient is in the office for follow-up for paroxysmal atrial fibrillation status post ablation August 2024 at [facility] and has remained in sinus rhythm on flecainide with long-term anticoagulation with warfarin.  EKG today confirmed normal sinus rhythm with normal intervals.  Her blood pressure is under control.  Denies any palpitations orthopnea PND lower extremity edema.  She reported that recently when she missed taking the flecainide she felt very dizzy.  She is not enthusiastic about stopping flecainide or anticoagulation for the time being which I agreed with.  She seemed to have no trouble taking these medications for now.  Most recent lab data from earlier this year were reviewed and her numbers are on target.  Her weight has come down nicely but remains morbidly obese and more weight loss is needed as we discussed today.    Assessment/recommendations:     1-paroxysmal atrial fibrillation, status post ablation August 2024 at [facility].  Patient preferred to stay on anticoagulation antiarrhythmic for the time being which I have no problem with.  2-essential hypertension, her blood pressure is under control with no changes needed  3-hyperlipidemia on statin therapy to be monitored  4-high risk medication with antiarrhythmics anticoagulants to be monitored  4-morbid obesity, lifestyle changes to lose weight were  "recommended, her weight has been coming down nicely and more weight loss is needed    Review of Systems   Cardiovascular:  Positive for chest pain and palpitations.   Neurological:  Positive for dizziness.   All other systems reviewed and are negative.           Vitals:    05/15/25 1001   BP: 108/64   BP Location: Left arm   Patient Position: Sitting   Pulse: 58   Weight: 104 kg (229 lb)   Height: 1.6 m (5' 3\")      EKG done in office today   Objective   Physical Exam  Constitutional:       Appearance: Normal appearance.   HENT:      Nose: Nose normal.   Neck:      Vascular: No carotid bruit.   Cardiovascular:      Rate and Rhythm: Normal rate.      Pulses: Normal pulses.      Heart sounds: Normal heart sounds.   Pulmonary:      Effort: Pulmonary effort is normal.   Abdominal:      General: Bowel sounds are normal.      Palpations: Abdomen is soft.   Musculoskeletal:         General: Normal range of motion.      Cervical back: Normal range of motion.      Right lower leg: No edema.      Left lower leg: No edema.   Skin:     General: Skin is warm and dry.   Neurological:      General: No focal deficit present.      Mental Status: She is alert.   Psychiatric:         Mood and Affect: Mood normal.         Behavior: Behavior normal.         Thought Content: Thought content normal.         Judgment: Judgment normal.         Allergies  Eliquis [apixaban]     Current Medications  Current Outpatient Medications   Medication Instructions   • ascorbic acid (Vitamin C) 1,000 mg tablet 1 tablet, Daily   • atorvastatin (LIPITOR) 20 mg, Daily   • calcium polycarbophil (FIBERCON ORAL) 1 capsule, Daily   • cranberry conc-C-bacillus coag (Cranberry Urinary Tract Health) 250-30-3.5 mg tablet 1 tablet, Daily   • dicyclomine (BENTYL) 20 mg, 4 times daily before meals and nightly   • flecainide (TAMBOCOR) 150 mg, oral, 2 times daily   • Lactobac 42-Bifid 7-lkknja-GHP (Probiotic Plus Colostrum) -50 mg powder in packet 1 capsule, " Daily   • metoprolol succinate XL (TOPROL-XL) 25 mg, oral, Daily   • multivitamin-Ca-iron-minerals (Tab-A-Cj Womens) 27-0.4 mg tablet 1 tablet, Daily   • pantoprazole (PROTONIX) 40 mg, oral, 2 times daily   • valsartan (DIOVAN) 160 mg, oral, Daily   • warfarin sodium (WARFARIN ORAL) Take by mouth. As directed per [facility]                        Assessment/Plan   1. Paroxysmal atrial fibrillation (Multi)  ECG 12 Lead    Follow Up In Cardiology    metoprolol succinate XL (Toprol-XL) 25 mg 24 hr tablet      2. Essential hypertension  Follow Up In Cardiology      3. Long term current use of anticoagulant therapy        4. Mixed hyperlipidemia        5. Never smoked tobacco        6. BMI 40.0-44.9, adult (Multi)        7. Morbid obesity (Multi)                 Scribe Attestation  By signing my name below, Karli HENRIQUEZ LPN, Scribe   attest that this documentation has been prepared under the direction and in the presence of Analy Zhang MD.     Provider Attestation - Scribe documentation    All medical record entries made by the Scribe were at my direction and personally dictated by me. I have reviewed the chart and agree that the record accurately reflects my personal performance of the history, physical exam, discussion and plan.

## 2025-05-15 NOTE — PATIENT INSTRUCTIONS
Please bring all medicines, vitamins, and herbal supplements with you when you come to the office.    Prescriptions will not be filled unless you are compliant with your follow up appointments or have a follow up appointment scheduled as per instruction of your physician. Refills should be requested at the time of your visit.     BMI was above normal measurement. Current weight: 104 kg (229 lb)  Weight change since last visit (-) denotes wt loss -13 lbs   Weight loss needed to achieve BMI 25: 88.2 Lbs  Weight loss needed to achieve BMI 30: 60 Lbs  Provided instructions on dietary changes  Provided instructions on exercise.

## 2025-05-15 NOTE — PROGRESS NOTES
"HPI  Patient is in the office for follow-up for paroxysmal atrial fibrillation status post ablation August 2024 at [facility] and has remained in sinus rhythm on flecainide with long-term anticoagulation with warfarin.  EKG today confirmed normal sinus rhythm with normal intervals.  Her blood pressure is under control.  Denies any palpitations orthopnea PND lower extremity edema.  She reported that recently when she missed taking the flecainide she felt very dizzy.  She is not enthusiastic about stopping flecainide or anticoagulation for the time being which I agreed with.  She seemed to have no trouble taking these medications for now.  Most recent lab data from earlier this year were reviewed and her numbers are on target.  Her weight has come down nicely but remains morbidly obese and more weight loss is needed as we discussed today.    Assessment/recommendations:     1-paroxysmal atrial fibrillation, status post ablation August 2024 at [facility].  Patient preferred to stay on anticoagulation antiarrhythmic for the time being which I have no problem with.  2-essential hypertension, her blood pressure is under control with no changes needed  3-hyperlipidemia on statin therapy to be monitored  4-high risk medication with antiarrhythmics anticoagulants to be monitored  4-morbid obesity, lifestyle changes to lose weight were recommended, her weight has been coming down nicely and more weight loss is needed    Review of Systems   Cardiovascular:  Positive for chest pain and palpitations.   Neurological:  Positive for dizziness.   All other systems reviewed and are negative.           Vitals:    05/15/25 1001   BP: 108/64   BP Location: Left arm   Patient Position: Sitting   Pulse: 58   Weight: 104 kg (229 lb)   Height: 1.6 m (5' 3\")      EKG done in office today   Objective   Physical Exam  Constitutional:       Appearance: Normal appearance.   HENT:      Nose: Nose normal.   Neck:      Vascular: No " carotid bruit.   Cardiovascular:      Rate and Rhythm: Normal rate.      Pulses: Normal pulses.      Heart sounds: Normal heart sounds.   Pulmonary:      Effort: Pulmonary effort is normal.   Abdominal:      General: Bowel sounds are normal.      Palpations: Abdomen is soft.   Musculoskeletal:         General: Normal range of motion.      Cervical back: Normal range of motion.      Right lower leg: No edema.      Left lower leg: No edema.   Skin:     General: Skin is warm and dry.   Neurological:      General: No focal deficit present.      Mental Status: She is alert.   Psychiatric:         Mood and Affect: Mood normal.         Behavior: Behavior normal.         Thought Content: Thought content normal.         Judgment: Judgment normal.         Allergies  Eliquis [apixaban]     Current Medications  Current Outpatient Medications   Medication Instructions    ascorbic acid (Vitamin C) 1,000 mg tablet 1 tablet, Daily    atorvastatin (LIPITOR) 20 mg, Daily    calcium polycarbophil (FIBERCON ORAL) 1 capsule, Daily    cranberry conc-C-bacillus coag (Cranberry Urinary Tract Health) 250-30-3.5 mg tablet 1 tablet, Daily    dicyclomine (BENTYL) 20 mg, 4 times daily before meals and nightly    flecainide (TAMBOCOR) 150 mg, oral, 2 times daily    Lactobac 42-Bifid 1-ngoogu-FCK (Probiotic Plus Colostrum) -50 mg powder in packet 1 capsule, Daily    metoprolol succinate XL (TOPROL-XL) 25 mg, oral, Daily    multivitamin-Ca-iron-minerals (Tab-A-Cj Womens) 27-0.4 mg tablet 1 tablet, Daily    pantoprazole (PROTONIX) 40 mg, oral, 2 times daily    valsartan (DIOVAN) 160 mg, oral, Daily    warfarin sodium (WARFARIN ORAL) Take by mouth. As directed per [facility] coumadin clinic                        Assessment/Plan   1. Paroxysmal atrial fibrillation (Multi)  ECG 12 Lead    Follow Up In Cardiology    metoprolol succinate XL (Toprol-XL) 25 mg 24 hr tablet      2. Essential hypertension  Follow Up In Cardiology      3. Long term  current use of anticoagulant therapy        4. Mixed hyperlipidemia        5. Never smoked tobacco        6. BMI 40.0-44.9, adult (Multi)        7. Morbid obesity (Multi)                 Scribe Attestation  By signing my name below, IKarli LPN, Scribe   attest that this documentation has been prepared under the direction and in the presence of Analy Zhang MD.     Provider Attestation - Scribe documentation    All medical record entries made by the Scribe were at my direction and personally dictated by me. I have reviewed the chart and agree that the record accurately reflects my personal performance of the history, physical exam, discussion and plan.

## 2025-06-18 DIAGNOSIS — I48.0 PAROXYSMAL ATRIAL FIBRILLATION (MULTI): ICD-10-CM

## 2025-06-19 RX ORDER — METOPROLOL SUCCINATE 25 MG/1
25 TABLET, EXTENDED RELEASE ORAL DAILY
Qty: 90 TABLET | Refills: 3 | Status: SHIPPED | OUTPATIENT
Start: 2025-06-19 | End: 2026-06-19

## 2026-01-22 ENCOUNTER — APPOINTMENT (OUTPATIENT)
Dept: CARDIOLOGY | Facility: CLINIC | Age: 71
End: 2026-01-22
Payer: MEDICARE

## (undated) DEVICE — PAD, ELECTRODE DEFIB PADPRO ADULT STRL W/ADAPTER

## (undated) DEVICE — PATCHES, EXTERNAL REFERENCE, CARTO3

## (undated) DEVICE — INTRODUCER, SHEATH, FAST-CATH, 8FR X 12CM, C-LOCK

## (undated) DEVICE — CATHETER, EPL, 7FR DUO, 2/8 2M 95CM, STEERABLE LGCRL

## (undated) DEVICE — CLOSURE SYSTEM, VASCULAR, MVP 6-12FR, VENOUS

## (undated) DEVICE — CATHETER, PENTARAY, NAV ECO, 7FR, 2-6-2 SPACING, D CURVE

## (undated) DEVICE — CONNECTOR, CATHETER, RESPONSE, RED HEX

## (undated) DEVICE — INTRODUCER, HEMOSTASIS, STR/J .038 IN, 8.5FR 12CM

## (undated) DEVICE — CABLE, CONNECTOR, 10FT

## (undated) DEVICE — CATHETER, THERMOCOOL SMART TOUCH, SF, D-F CURVE

## (undated) DEVICE — CABLE, CARTO 3 SYSTEM, ECO INTERFACE, 34-PIN, SPLIT HANDLE (REPROCESSED)

## (undated) DEVICE — TUBING SET, IRRIGATION, SMARTABLATE

## (undated) DEVICE — NEEDLE, NRG TRANSSEPTAL, 98CM, CURVE C0

## (undated) DEVICE — CATHETER, ACUSON ACUNAV ULTRASOUND, 8FR 90CM  (REPROCESSED)

## (undated) DEVICE — CABLE, CONNECTOR, DAIG RESPONSE, 210CM, BLACK

## (undated) DEVICE — SHIELD, RADPAD, EP LEFT SUBCLAVIAN, 12.5 X 16.5, YELLOW LEVEL ATTENUATION

## (undated) DEVICE — SHEATH, STEERABLE, SUREFLEX, MEDIUM CURVE

## (undated) DEVICE — CABLE, 34 HYP, 34 LEMO, 10FT, SMART TOUCH (REPROCESS)